# Patient Record
Sex: FEMALE | Race: BLACK OR AFRICAN AMERICAN | NOT HISPANIC OR LATINO | Employment: PART TIME | URBAN - METROPOLITAN AREA
[De-identification: names, ages, dates, MRNs, and addresses within clinical notes are randomized per-mention and may not be internally consistent; named-entity substitution may affect disease eponyms.]

---

## 2018-12-17 ENCOUNTER — HOSPITAL ENCOUNTER (EMERGENCY)
Facility: HOSPITAL | Age: 68
Discharge: HOME | End: 2018-12-18
Attending: EMERGENCY MEDICINE
Payer: MEDICARE

## 2018-12-17 DIAGNOSIS — T38.3X5A HYPOGLYCEMIA DUE TO INSULIN: Primary | ICD-10-CM

## 2018-12-17 DIAGNOSIS — E16.0 HYPOGLYCEMIA SECONDARY TO SULFONYLUREA, ACCIDENTAL OR UNINTENTIONAL, INITIAL ENCOUNTER: ICD-10-CM

## 2018-12-17 DIAGNOSIS — T38.3X1A HYPOGLYCEMIA SECONDARY TO SULFONYLUREA, ACCIDENTAL OR UNINTENTIONAL, INITIAL ENCOUNTER: ICD-10-CM

## 2018-12-17 DIAGNOSIS — E16.0 HYPOGLYCEMIA DUE TO INSULIN: Primary | ICD-10-CM

## 2018-12-17 LAB
BASOPHILS # BLD AUTO: 0.02 K/UL
BASOPHILS NFR BLD: 0.3 %
DIFFERENTIAL METHOD: ABNORMAL
EOSINOPHIL # BLD AUTO: 0.1 K/UL
EOSINOPHIL NFR BLD: 1.7 %
ERYTHROCYTE [DISTWIDTH] IN BLOOD BY AUTOMATED COUNT: 13.1 %
HCT VFR BLD AUTO: 35.7 %
HGB BLD-MCNC: 11.1 G/DL
LYMPHOCYTES # BLD AUTO: 1.7 K/UL
LYMPHOCYTES NFR BLD: 23.6 %
MCH RBC QN AUTO: 27.3 PG
MCHC RBC AUTO-ENTMCNC: 31.1 G/DL
MCV RBC AUTO: 88 FL
MONOCYTES # BLD AUTO: 1.3 K/UL
MONOCYTES NFR BLD: 18.2 %
NEUTROPHILS # BLD AUTO: 4 K/UL
NEUTROPHILS NFR BLD: 56.1 %
PLATELET # BLD AUTO: 242 K/UL
PMV BLD AUTO: 10.5 FL
POCT GLUCOSE: 173 MG/DL (ref 70–110)
POCT GLUCOSE: 33 MG/DL (ref 70–110)
RBC # BLD AUTO: 4.07 M/UL
WBC # BLD AUTO: 7.13 K/UL

## 2018-12-17 PROCEDURE — 85025 COMPLETE CBC W/AUTO DIFF WBC: CPT

## 2018-12-17 PROCEDURE — 63600175 PHARM REV CODE 636 W HCPCS: Performed by: EMERGENCY MEDICINE

## 2018-12-17 PROCEDURE — 25000003 PHARM REV CODE 250

## 2018-12-17 PROCEDURE — 82962 GLUCOSE BLOOD TEST: CPT | Mod: 91

## 2018-12-17 PROCEDURE — 80053 COMPREHEN METABOLIC PANEL: CPT

## 2018-12-17 PROCEDURE — 96374 THER/PROPH/DIAG INJ IV PUSH: CPT

## 2018-12-17 PROCEDURE — 99285 EMERGENCY DEPT VISIT HI MDM: CPT | Mod: 25

## 2018-12-17 RX ORDER — DEXTROSE 50 % IN WATER (D50W) INTRAVENOUS SYRINGE
Status: COMPLETED
Start: 2018-12-17 | End: 2018-12-17

## 2018-12-17 RX ORDER — OCTREOTIDE ACETATE 100 UG/ML
100 INJECTION, SOLUTION INTRAVENOUS; SUBCUTANEOUS
Status: COMPLETED | OUTPATIENT
Start: 2018-12-17 | End: 2018-12-17

## 2018-12-17 RX ORDER — DEXTROSE 50 % IN WATER (D50W) INTRAVENOUS SYRINGE
25
Status: COMPLETED | OUTPATIENT
Start: 2018-12-17 | End: 2018-12-17

## 2018-12-17 RX ADMIN — DEXTROSE 50 % IN WATER (D50W) INTRAVENOUS SYRINGE 25 G: at 11:12

## 2018-12-17 RX ADMIN — DEXTROSE MONOHYDRATE 25 G: 500 INJECTION PARENTERAL at 11:12

## 2018-12-17 RX ADMIN — OCTREOTIDE ACETATE 100 MCG: 100 INJECTION, SOLUTION INTRAVENOUS; SUBCUTANEOUS at 11:12

## 2018-12-18 VITALS
WEIGHT: 240 LBS | TEMPERATURE: 98 F | OXYGEN SATURATION: 94 % | HEIGHT: 64 IN | BODY MASS INDEX: 40.97 KG/M2 | RESPIRATION RATE: 20 BRPM | SYSTOLIC BLOOD PRESSURE: 172 MMHG | DIASTOLIC BLOOD PRESSURE: 77 MMHG | HEART RATE: 72 BPM

## 2018-12-18 LAB
ALBUMIN SERPL BCP-MCNC: 3.4 G/DL
ALP SERPL-CCNC: 71 U/L
ALT SERPL W/O P-5'-P-CCNC: 18 U/L
ANION GAP SERPL CALC-SCNC: 11 MMOL/L
AST SERPL-CCNC: 31 U/L
BILIRUB SERPL-MCNC: 0.3 MG/DL
BILIRUB UR QL STRIP: NEGATIVE
BUN SERPL-MCNC: 22 MG/DL
CALCIUM SERPL-MCNC: 9 MG/DL
CHLORIDE SERPL-SCNC: 106 MMOL/L
CLARITY UR: CLEAR
CO2 SERPL-SCNC: 20 MMOL/L
COLOR UR: YELLOW
CREAT SERPL-MCNC: 1.5 MG/DL
EST. GFR  (AFRICAN AMERICAN): 42 ML/MIN/1.73 M^2
EST. GFR  (NON AFRICAN AMERICAN): 36 ML/MIN/1.73 M^2
GLUCOSE SERPL-MCNC: 42 MG/DL
GLUCOSE UR QL STRIP: NEGATIVE
HGB UR QL STRIP: NEGATIVE
KETONES UR QL STRIP: NEGATIVE
LEUKOCYTE ESTERASE UR QL STRIP: NEGATIVE
NITRITE UR QL STRIP: NEGATIVE
PH UR STRIP: 5 [PH] (ref 5–8)
POCT GLUCOSE: 164 MG/DL (ref 70–110)
POCT GLUCOSE: 325 MG/DL (ref 70–110)
POTASSIUM SERPL-SCNC: 4.5 MMOL/L
PROT SERPL-MCNC: 7.2 G/DL
PROT UR QL STRIP: ABNORMAL
SODIUM SERPL-SCNC: 137 MMOL/L
SP GR UR STRIP: >=1.03 (ref 1–1.03)
URN SPEC COLLECT METH UR: ABNORMAL
UROBILINOGEN UR STRIP-ACNC: NEGATIVE EU/DL

## 2018-12-18 PROCEDURE — 81003 URINALYSIS AUTO W/O SCOPE: CPT

## 2018-12-18 RX ORDER — ALBUTEROL SULFATE 90 UG/1
1-2 AEROSOL, METERED RESPIRATORY (INHALATION) EVERY 6 HOURS PRN
Qty: 1 INHALER | Refills: 0 | Status: SHIPPED | OUTPATIENT
Start: 2018-12-18

## 2018-12-18 RX ORDER — BENZONATATE 200 MG/1
200 CAPSULE ORAL 3 TIMES DAILY PRN
Qty: 30 CAPSULE | Refills: 0 | Status: SHIPPED | OUTPATIENT
Start: 2018-12-18

## 2018-12-18 NOTE — ED NOTES
"Family brought pt in and states that she "was not acting normal". CBG at home 38. Pt ate chips. Upon arrival pt's CBG 33. Orange juice and cookies given. Pt has been asymptomatic since arrival.  "

## 2018-12-18 NOTE — ED PROVIDER NOTES
"Encounter Date: 12/17/2018    SCRIBE #1 NOTE: I, Ok Schofield, am scribing for, and in the presence of,  Dr. Juvenal Garcia. I have scribed the entire note.       History     Chief Complaint   Patient presents with    Hypoglycemia     Family reports patients cbg at home was 38. States "she was talking out of her mind". CBG was 33 mg/dl here in the ER. Patient is awake and alert, Denies any dizziness, restlessness, or diaphoresis.     Altered Mental Status     This is a 68 y.o. female who has no past medical history on file.     The patient presents to the Emergency Department for evaluation of hypoglycemia.  Patient had chips to eat after measured blood glucose of 38 without improvement.   Symptoms are associated with cough, sinus pressure, and AMS, per family.  Family states patient was talking excessively while watching TV.  Pt denies fever,  Rhinorrhea, or any other complaints.  Patient has had prior history of similar symptoms.  Family states patient does not eat correctly and only has 1 meal a day.      The history is provided by the patient and a relative.     Review of patient's allergies indicates:   Allergen Reactions    Codeine      No past medical history on file.  No past surgical history on file.  No family history on file.  Social History     Tobacco Use    Smoking status: Not on file   Substance Use Topics    Alcohol use: Not on file    Drug use: Not on file     Review of Systems   Constitutional: Negative for fever.   HENT: Positive for sinus pressure. Negative for rhinorrhea.    Respiratory: Positive for cough.    Psychiatric/Behavioral:        + AMS, per family   All other systems reviewed and are negative.      Physical Exam     Initial Vitals [12/17/18 2313]   BP Pulse Resp Temp SpO2   136/62 80 20 98.2 °F (36.8 °C) 95 %      MAP       --         Physical Exam    Nursing note and vitals reviewed.  Constitutional: She appears well-developed and well-nourished. No distress.   Obese.   HENT:   Head: " Normocephalic and atraumatic.   Mouth/Throat: Oropharynx is clear and moist.   Eyes: Conjunctivae and EOM are normal. Pupils are equal, round, and reactive to light.   Neck: Normal range of motion. Neck supple.   Cardiovascular: Normal rate, regular rhythm, normal heart sounds and intact distal pulses. Exam reveals no gallop and no friction rub.    No murmur heard.  Pulmonary/Chest: No respiratory distress. She has wheezes. She has no rhonchi. She has no rales.   Expiratory wheezing to lower lung fields bilaterally.   Abdominal: Soft. She exhibits no distension and no mass. There is no tenderness.   Musculoskeletal: Normal range of motion.   No LE edema.   Neurological: She is alert and oriented to person, place, and time.   Skin: Skin is warm and dry.         ED Course   Procedures  Labs Reviewed   CBC W/ AUTO DIFFERENTIAL - Abnormal; Notable for the following components:       Result Value    Hemoglobin 11.1 (*)     Hematocrit 35.7 (*)     MCHC 31.1 (*)     Mono # 1.3 (*)     Mono% 18.2 (*)     All other components within normal limits   COMPREHENSIVE METABOLIC PANEL - Abnormal; Notable for the following components:    CO2 20 (*)     Glucose 42 (*)     Creatinine 1.5 (*)     Albumin 3.4 (*)     eGFR if  42 (*)     eGFR if non  36 (*)     All other components within normal limits    Narrative:     GLU critical result(s) called and verbal readback obtained from   Shanice Sánchez RN, 12/18/2018 00:19   URINALYSIS, REFLEX TO URINE CULTURE - Abnormal; Notable for the following components:    Specific Gravity, UA >=1.030 (*)     Protein, UA Trace (*)     All other components within normal limits    Narrative:     Preferred Collection Type->Urine, Clean Catch   POCT GLUCOSE - Abnormal; Notable for the following components:    POCT Glucose 33 (*)     All other components within normal limits   POCT GLUCOSE - Abnormal; Notable for the following components:    POCT Glucose 173 (*)     All  "other components within normal limits   POCT GLUCOSE - Abnormal; Notable for the following components:    POCT Glucose 164 (*)     All other components within normal limits   POCT GLUCOSE - Abnormal; Notable for the following components:    POCT Glucose 325 (*)     All other components within normal limits   POCT GLUCOSE, HAND-HELD DEVICE          Imaging Results          X-Ray Chest PA And Lateral (Final result)  Result time 12/18/18 01:08:02    Final result by Seun Bhatti MD (12/18/18 01:08:02)                 Impression:      No acute cardiopulmonary finding.      Electronically signed by: Seun Bhatti MD  Date:    12/18/2018  Time:    01:08             Narrative:    EXAMINATION:  XR CHEST PA AND LATERAL    CLINICAL HISTORY:  Provided history is "COUGH;  ".    TECHNIQUE:  Frontal and lateral views of the chest were performed.    COMPARISON:  None.    FINDINGS:  Cardiac silhouette is not enlarged.  Atherosclerotic calcifications overlie the aortic arch.  No focal consolidation.  No sizable pleural effusion.  No pneumothorax.                                 Medical Decision Making:   Clinical Tests:   Lab Tests: Ordered and Reviewed  Radiological Study: Ordered and Reviewed              Attending Attestation:         Attending Critical Care:   Critical Care Times:   ==============================================================  · Total Critical Care Time - exclusive of procedural time: 45 minutes.  ==============================================================  Critical care was necessary to treat or prevent imminent or life-threatening deterioration of the following conditions: endocrine crisis.               ED Course as of Dec 18 0435   Mon Dec 17, 2018   9900 I, Dr. Juvenal Garcia, personally performed the services described in this documentation.   All medical record entries made by the scribe were at my direction and in my presence.   I have reviewed the chart and agree that the record is accurate and " complete.   uJvenal Garcia MD.    [NP]   7403 This is an emergent evaluation of a 68 y.o.female patient with presentation of hypoglycemia on long-acting insulin, glipizide.   Initial differentials include but are not limited to: sulfonylurea toxicity, UTI, COREY, other infectious etiology, decrease PO intake.   Plan: basic labs, UA, CXR (cough), octreotide, meal tray, q1hr fingersticks  [NP]   Tue Dec 18, 2018   0127 POCT Glucose: (!) 164 [NP]   0127 Sodium: 137 [NP]   0127 Potassium: 4.5 [NP]   0127 Chloride: 106 [NP]   0127 CO2: (!) 20 [NP]   0127 BUN, Bld: 22 [NP]   0127 Creatinine: (!) 1.5 [NP]   0127 WBC: 7.13 [NP]   0127 Hemoglobin: (!) 11.1 [NP]   0127 Hematocrit: (!) 35.7 [NP]   0127 Platelets: 242 [NP]      ED Course User Index  [NP] Juvenal Garcia MD     3AM - Repeat blood glucose 325.  Glucose is climbing and I believe patient is stable for discharge at this time.  Advised to drink plenty of fluids, monitor sugar at home especially before every meal.  Also advised to take medications as prescribed, however to hold if sugar is low.  Follow up with PCP in 1 week or return for any concerns.    Clinical Impression:     1. Hypoglycemia due to insulin    2. Hypoglycemia secondary to sulfonylurea, accidental or unintentional, initial encounter                                   Juvenal Garcia MD  12/18/18 9925       Juvenal Garcia MD  12/18/18 1853

## 2018-12-18 NOTE — ED NOTES
Report received from CODY Ching. Care assumed. Pt AAOx4. Respirations even and unlabored. Pt VSS. Will continue to monitor.

## 2018-12-18 NOTE — DISCHARGE INSTRUCTIONS
Take your blood sugar before every meal.  If your sugar is low (<60), consider holding your diabetic medication.  Eat a well-balanced diet with a lot of vegetables.   Only eat a small amount of meat.   Eliminate sugar, starch, processed foods such as chips/crackers.    Thank you for choosing [facility]! We appreciate you coming to us for your medical care. We hope you feel better soon! Please come back to [facility] for all of your future medical needs.      Sincerely,    Juvenal Garcia MD  Medical Director  Emergency Department

## 2023-03-06 ENCOUNTER — HOSPITAL ENCOUNTER (EMERGENCY)
Facility: HOSPITAL | Age: 73
Discharge: HOME | End: 2023-03-06
Attending: STUDENT IN AN ORGANIZED HEALTH CARE EDUCATION/TRAINING PROGRAM
Payer: MEDICARE

## 2023-03-06 VITALS
HEIGHT: 64 IN | HEART RATE: 60 BPM | OXYGEN SATURATION: 100 % | RESPIRATION RATE: 18 BRPM | TEMPERATURE: 98 F | WEIGHT: 249 LBS | SYSTOLIC BLOOD PRESSURE: 174 MMHG | BODY MASS INDEX: 42.51 KG/M2 | DIASTOLIC BLOOD PRESSURE: 70 MMHG

## 2023-03-06 DIAGNOSIS — R10.9 ABDOMINAL PAIN: ICD-10-CM

## 2023-03-06 DIAGNOSIS — R10.9 ABDOMINAL PAIN, UNSPECIFIED ABDOMINAL LOCATION: Primary | ICD-10-CM

## 2023-03-06 LAB
ALBUMIN SERPL BCP-MCNC: 3.8 G/DL (ref 3.5–5.2)
ALP SERPL-CCNC: 80 U/L (ref 55–135)
ALT SERPL W/O P-5'-P-CCNC: 13 U/L (ref 10–44)
ANION GAP SERPL CALC-SCNC: 10 MMOL/L (ref 8–16)
AST SERPL-CCNC: 15 U/L (ref 10–40)
BASOPHILS # BLD AUTO: 0.04 K/UL (ref 0–0.2)
BASOPHILS NFR BLD: 0.4 % (ref 0–1.9)
BILIRUB SERPL-MCNC: 0.8 MG/DL (ref 0.1–1)
BILIRUB UR QL STRIP: NEGATIVE
BUN SERPL-MCNC: 29 MG/DL (ref 8–23)
CALCIUM SERPL-MCNC: 9.5 MG/DL (ref 8.7–10.5)
CHLORIDE SERPL-SCNC: 108 MMOL/L (ref 95–110)
CLARITY UR: CLEAR
CO2 SERPL-SCNC: 24 MMOL/L (ref 23–29)
COLOR UR: YELLOW
CREAT SERPL-MCNC: 1.7 MG/DL (ref 0.5–1.4)
DIFFERENTIAL METHOD: ABNORMAL
EOSINOPHIL # BLD AUTO: 0.3 K/UL (ref 0–0.5)
EOSINOPHIL NFR BLD: 2.9 % (ref 0–8)
ERYTHROCYTE [DISTWIDTH] IN BLOOD BY AUTOMATED COUNT: 13.7 % (ref 11.5–14.5)
EST. GFR  (NO RACE VARIABLE): 32 ML/MIN/1.73 M^2
GLUCOSE SERPL-MCNC: 90 MG/DL (ref 70–110)
GLUCOSE UR QL STRIP: NEGATIVE
HCT VFR BLD AUTO: 37.7 % (ref 37–48.5)
HGB BLD-MCNC: 11.9 G/DL (ref 12–16)
HGB UR QL STRIP: NEGATIVE
IMM GRANULOCYTES # BLD AUTO: 0.05 K/UL (ref 0–0.04)
IMM GRANULOCYTES NFR BLD AUTO: 0.4 % (ref 0–0.5)
KETONES UR QL STRIP: NEGATIVE
LEUKOCYTE ESTERASE UR QL STRIP: NEGATIVE
LYMPHOCYTES # BLD AUTO: 1.8 K/UL (ref 1–4.8)
LYMPHOCYTES NFR BLD: 15.8 % (ref 18–48)
MCH RBC QN AUTO: 28 PG (ref 27–31)
MCHC RBC AUTO-ENTMCNC: 31.6 G/DL (ref 32–36)
MCV RBC AUTO: 89 FL (ref 82–98)
MONOCYTES # BLD AUTO: 1 K/UL (ref 0.3–1)
MONOCYTES NFR BLD: 8.4 % (ref 4–15)
NEUTROPHILS # BLD AUTO: 8.2 K/UL (ref 1.8–7.7)
NEUTROPHILS NFR BLD: 72.1 % (ref 38–73)
NITRITE UR QL STRIP: NEGATIVE
NRBC BLD-RTO: 0 /100 WBC
PH UR STRIP: 6 [PH] (ref 5–8)
PLATELET # BLD AUTO: 264 K/UL (ref 150–450)
PMV BLD AUTO: 10.8 FL (ref 9.2–12.9)
POTASSIUM SERPL-SCNC: 5.3 MMOL/L (ref 3.5–5.1)
PROT SERPL-MCNC: 8.1 G/DL (ref 6–8.4)
PROT UR QL STRIP: NEGATIVE
RBC # BLD AUTO: 4.25 M/UL (ref 4–5.4)
SODIUM SERPL-SCNC: 142 MMOL/L (ref 136–145)
SP GR UR STRIP: 1.01 (ref 1–1.03)
URN SPEC COLLECT METH UR: NORMAL
UROBILINOGEN UR STRIP-ACNC: NEGATIVE EU/DL
WBC # BLD AUTO: 11.4 K/UL (ref 3.9–12.7)

## 2023-03-06 PROCEDURE — 99284 EMERGENCY DEPT VISIT MOD MDM: CPT

## 2023-03-06 PROCEDURE — 81003 URINALYSIS AUTO W/O SCOPE: CPT | Performed by: NURSE PRACTITIONER

## 2023-03-06 PROCEDURE — 80053 COMPREHEN METABOLIC PANEL: CPT | Performed by: NURSE PRACTITIONER

## 2023-03-06 PROCEDURE — 85025 COMPLETE CBC W/AUTO DIFF WBC: CPT | Performed by: NURSE PRACTITIONER

## 2023-03-06 RX ORDER — ONDANSETRON 2 MG/ML
4 INJECTION INTRAMUSCULAR; INTRAVENOUS
Status: DISCONTINUED | OUTPATIENT
Start: 2023-03-06 | End: 2023-03-06 | Stop reason: HOSPADM

## 2023-03-06 RX ORDER — ONDANSETRON 4 MG/1
4 TABLET, ORALLY DISINTEGRATING ORAL EVERY 6 HOURS PRN
Qty: 30 TABLET | Refills: 0 | Status: SHIPPED | OUTPATIENT
Start: 2023-03-06

## 2023-03-06 NOTE — FIRST PROVIDER EVALUATION
Emergency Department TeleTriage Encounter Note      CHIEF COMPLAINT    Chief Complaint   Patient presents with    Abdominal Pain     Right flank/RUQ/RLQ pain with nausea x 2 weeks, denies urinary complaints, increased pain with movement        VITAL SIGNS   Initial Vitals [03/06/23 1724]   BP Pulse Resp Temp SpO2   (!) 187/78 (!) 57 20 98 °F (36.7 °C) 95 %      MAP       --            ALLERGIES    Review of patient's allergies indicates:   Allergen Reactions    Codeine        PROVIDER TRIAGE NOTE  This is a teletriage evaluation of a 72 y.o. female presenting to the ED with c/o right sided abdominal/flank pain, x 2 weeks, intermittent. Limited physical exam via telehealth: The patient is awake, alert, answering questions appropriately and is not in respiratory distress. Initial orders will be placed and care will be transferred to an alternate provider when patient is roomed for a full evaluation. Any additional orders and the final disposition will be determined by that provider.         ORDERS  Labs Reviewed - No data to display    ED Orders (720h ago, onward)      Start Ordered     Status Ordering Provider    03/06/23 1729 03/06/23 1728  Saline lock IV  Once         Ordered CYNDI CEJA    03/06/23 1729 03/06/23 1728  CBC auto differential  STAT         Ordered CYNDI CEJA    03/06/23 1729 03/06/23 1728  Comprehensive metabolic panel  STAT         Ordered CYNDI CEJA    03/06/23 1729 03/06/23 1728  Urinalysis, Reflex to Urine Culture Urine, Clean Catch  STAT         Ordered CYNDI CEJA              Virtual Visit Note: The provider triage portion of this emergency department evaluation and documentation was performed via Jogg, a HIPAA-compliant telemedicine application, in concert with a tele-presenter in the room. A face to face patient evaluation with one of my colleagues will occur once the patient is placed in an emergency department room.      DISCLAIMER: This note was prepared  with M*Modal voice recognition transcription software. Garbled syntax, mangled pronouns, and other bizarre constructions may be attributed to that software system.

## 2023-03-07 NOTE — ED PROVIDER NOTES
Encounter Date: 3/6/2023       History     Chief Complaint   Patient presents with    Abdominal Pain     Right flank/RUQ/RLQ pain with nausea x 2 weeks, denies urinary complaints, increased pain with movement      72 year old female who presents asymptomatic. She says that she had abdominal pain earlier in the day while she was in her kitchen scrubbing dishes but now it is gone. She says that the pain is completely gone at this time and would like to leave.    Review of patient's allergies indicates:   Allergen Reactions    Codeine      No past medical history on file.  No past surgical history on file.  No family history on file.     Review of Systems   All other systems reviewed and are negative.    Physical Exam     Initial Vitals [03/06/23 1724]   BP Pulse Resp Temp SpO2   (!) 187/78 (!) 57 20 98 °F (36.7 °C) 95 %      MAP       --         Physical Exam    Nursing note and vitals reviewed.  Constitutional: She appears well-developed and well-nourished. She is not diaphoretic. No distress.   HENT:   Head: Normocephalic and atraumatic.   Eyes: EOM are normal. Pupils are equal, round, and reactive to light.   Neck: No tracheal deviation present.   Normal range of motion.  Cardiovascular:  Normal rate, regular rhythm, normal heart sounds and intact distal pulses.     Exam reveals no gallop and no friction rub.       No murmur heard.  Pulmonary/Chest: Breath sounds normal. No stridor. No respiratory distress. She has no wheezes. She has no rhonchi. She has no rales.   Abdominal: Abdomen is soft. Bowel sounds are normal. She exhibits no distension and no mass. There is no abdominal tenderness.   Patient ambulated without return of the pain.  There is no rebound and no guarding.   Musculoskeletal:         General: No edema. Normal range of motion.      Cervical back: Normal range of motion.     Neurological: She is alert and oriented to person, place, and time. GCS score is 15. GCS eye subscore is 4. GCS verbal  subscore is 5. GCS motor subscore is 6.   Skin: Skin is warm and dry. Capillary refill takes less than 2 seconds.   Psychiatric: She has a normal mood and affect. Thought content normal.       ED Course   Procedures  Labs Reviewed   CBC W/ AUTO DIFFERENTIAL - Abnormal; Notable for the following components:       Result Value    Hemoglobin 11.9 (*)     MCHC 31.6 (*)     Gran # (ANC) 8.2 (*)     Immature Grans (Abs) 0.05 (*)     Lymph % 15.8 (*)     All other components within normal limits   COMPREHENSIVE METABOLIC PANEL - Abnormal; Notable for the following components:    Potassium 5.3 (*)     BUN 29 (*)     Creatinine 1.7 (*)     eGFR 32 (*)     All other components within normal limits   URINALYSIS, REFLEX TO URINE CULTURE    Narrative:     Specimen Source->Urine   TROPONIN I          Imaging Results    None          Medications   ondansetron injection 4 mg (4 mg Intravenous Not Given 3/6/23 1730)     Medical Decision Making:   Initial Assessment:   Hemodynamically stable. Afebrile. Phonating and protecting the airway spontaneously. No clinical evidence for cardiovascular instability or impending airway compromise. Examination as above. Labs reviewed. Patient was informed of these studies including her creatinine. I offered a CTAP due to her age and chief complaint in addition to cardiac testing. Patient instead wished to go home. She said it was late and she lives closely. Shared decision making conversation held. Strict return precautions given.   ED Management:  The patient was reassessed and on subsequent re-evaluation, they were subjectively feeling better. They were resting comfortably and in no acute distress. I discussed the laboratory and diagnostic findings with the patient. Education was provided and all questions were answered. As discussed, they were recommended to follow up with their primary care physician within the next few days and to return to the emergency department sooner for any new or  worsening. They acknowledged and verbalized agreement to the treatment plan. The patient was discharged home in stable condition.     DISCLAIMER: This note was prepared with [employer] voice recognition transcription software. Garbled syntax, mangled pronouns, and other bizarre constructions may be attributed to that software system.                            Clinical Impression:   Final diagnoses:  [R10.9] Abdominal pain  [R10.9] Abdominal pain, unspecified abdominal location (Primary)        ED Disposition Condition    Discharge Stable          ED Prescriptions       Medication Sig Dispense Start Date End Date Auth. Provider    ondansetron (ZOFRAN-ODT) 4 MG TbDL Take 1 tablet (4 mg total) by mouth every 6 (six) hours as needed (nausea). 30 tablet 3/6/2023 -- Jovany Schaeffer MD          Follow-up Information       Follow up With Specialties Details Why Contact Info    [facility]  Go to  As needed [address] [address] [id number]  [phone]             Jovany Schaeffer MD  03/06/23 3980

## 2023-03-07 NOTE — DISCHARGE INSTRUCTIONS

## 2023-05-09 ENCOUNTER — PATIENT MESSAGE (OUTPATIENT)
Dept: RESEARCH | Facility: HOSPITAL | Age: 73
End: 2023-05-09
Payer: MEDICARE

## 2023-05-16 ENCOUNTER — PATIENT MESSAGE (OUTPATIENT)
Dept: RESEARCH | Facility: HOSPITAL | Age: 73
End: 2023-05-16
Payer: MEDICARE

## 2023-08-03 NOTE — ED NOTES
Patient given a cup of orange juice and a sugar cookie per JOHN Caldwell RN.    Vaccine status unknown

## 2024-02-09 ENCOUNTER — OFFICE VISIT (OUTPATIENT)
Dept: URGENT CARE | Facility: CLINIC | Age: 74
End: 2024-02-09
Payer: MEDICARE

## 2024-02-09 VITALS
SYSTOLIC BLOOD PRESSURE: 147 MMHG | DIASTOLIC BLOOD PRESSURE: 74 MMHG | TEMPERATURE: 98 F | OXYGEN SATURATION: 95 % | HEART RATE: 67 BPM | HEIGHT: 64 IN | BODY MASS INDEX: 42.49 KG/M2 | WEIGHT: 248.88 LBS | RESPIRATION RATE: 18 BRPM

## 2024-02-09 DIAGNOSIS — S42.291A HUMERAL HEAD FRACTURE, RIGHT, CLOSED, INITIAL ENCOUNTER: Primary | ICD-10-CM

## 2024-02-09 DIAGNOSIS — S49.91XA ARM INJURY, RIGHT, INITIAL ENCOUNTER: ICD-10-CM

## 2024-02-09 DIAGNOSIS — S00.81XA ABRASION OF FACE, INITIAL ENCOUNTER: ICD-10-CM

## 2024-02-09 PROCEDURE — 90471 IMMUNIZATION ADMIN: CPT | Mod: S$GLB,,,

## 2024-02-09 PROCEDURE — 73060 X-RAY EXAM OF HUMERUS: CPT | Mod: FY,RT,S$GLB, | Performed by: RADIOLOGY

## 2024-02-09 PROCEDURE — 90714 TD VACC NO PRESV 7 YRS+ IM: CPT | Mod: S$GLB,,,

## 2024-02-09 PROCEDURE — 99204 OFFICE O/P NEW MOD 45 MIN: CPT | Mod: 25,S$GLB,,

## 2024-02-09 RX ORDER — MUPIROCIN 20 MG/G
OINTMENT TOPICAL 3 TIMES DAILY
Qty: 22 G | Refills: 0 | Status: SHIPPED | OUTPATIENT
Start: 2024-02-09

## 2024-02-09 RX ORDER — MUPIROCIN 20 MG/G
OINTMENT TOPICAL
Status: COMPLETED | OUTPATIENT
Start: 2024-02-09 | End: 2024-02-09

## 2024-02-09 RX ADMIN — MUPIROCIN: 20 OINTMENT TOPICAL at 05:02

## 2024-02-09 NOTE — PATIENT INSTRUCTIONS
Humerus Fracture  You have a broken bone in your right upper arm.  Please wear sling at all times.  You may take tylenol as needed for pain.  Please use ice to help with pain and swelling.  Do not use the arm, please allow it to rest and heal.  A referral has been placed for you to be seen with an orthopedic doctor. You should receive a call from [facility] within the next 1-3 days to set up an appointment. If you do not receive a call, you may call our Referral Hotline at [phone] to make an appointment.  If you develop worsening pain, swelling, color change, numbness/tingling, please go to ED.  Facial Abrasions  Please apply mupirocin ointment to prevent infection.  Tetanus was updated today in clinic.  Facial Injury  Please continue to monitor yourself for any signs of head injury.  If you develop headache, dizziness, neck pain, confusion, speech changes, facial droop, extremity weakness/numbness, PLEASE GO TO THE EMERGENCY ROOM.

## 2024-02-09 NOTE — PROGRESS NOTES
"Subjective:      Patient ID: Christine Delgadillo is a 73 y.o. female.    Vitals:  height is 5' 4" (1.626 m) and weight is 112.9 kg (248 lb 14.4 oz). Her oral temperature is 98.1 °F (36.7 °C). Her blood pressure is 147/74 (abnormal) and her pulse is 67. Her respiration is 18 and oxygen saturation is 95%.     Chief Complaint: Arm Injury    Pt arrives for injury to right arm,left knee, and face due to fall. Fall occurred today. No at home tx was done.    Provider note starts below:  Patient presents to clinic for evaluation after a fall. Patient was getting out of the car when she missed her step and fell onto the cement. Patient reports landing on her right arm and brushing her nose against the cement. She is complaining of right upper arm pain. There are small abrasions to her nasal bridge and upper lip, but no active bleeding. She reports abrasion to left knee but no left knee pain. Patient did not lose consciousness. She was ambulatory after the fall. Denies headache, neck pain, neck stiffness, back pain, nausea, vomiting, vision changes, extremity weakness/numbness, dizziness, lightheadedness, speech difficulty, facial droop. Of note, patient is on Plavix. Tetanus status is not up to date.     Arm Injury   The incident occurred 3 to 6 hours ago. Incident location: doctors office. The injury mechanism was a fall. The pain is present in the upper right arm (left knee, and face). The quality of the pain is described as aching and shooting. The pain is at a severity of 9/10. The pain has been Constant since the incident. Pertinent negatives include no chest pain or numbness. She has tried nothing for the symptoms. The treatment provided no relief.       Constitution: Negative for activity change, chills and fever.   HENT:  Positive for facial swelling and facial trauma. Negative for ear pain, dental problem, mouth sores, tongue pain, nosebleeds and sinus pain.    Neck: Negative for neck pain and neck stiffness. "   Cardiovascular:  Negative for chest pain and palpitations.   Eyes:  Negative for eye trauma, eye pain, vision loss, double vision and blurred vision.   Respiratory:  Negative for chest tightness, cough, bloody sputum and shortness of breath.    Gastrointestinal:  Negative for abdominal trauma, abdominal pain, nausea and vomiting.   Musculoskeletal:  Positive for pain (right upper arm) and trauma. Negative for joint pain, joint swelling, abnormal ROM of joint and back pain.   Skin:  Positive for abrasion. Negative for pale, rash and laceration.   Neurological:  Negative for dizziness, light-headedness, passing out, facial drooping, speech difficulty, coordination disturbances, loss of balance, headaches, disorientation, altered mental status, loss of consciousness, numbness, tingling, seizures and tremors.   Psychiatric/Behavioral:  Negative for altered mental status, disorientation and confusion.       Objective:     Physical Exam   Constitutional: She is oriented to person, place, and time.  Non-toxic appearance. She does not appear ill. No distress.   HENT:   Head: Normocephalic.   Eyes: Conjunctivae are normal. Extraocular movement intact   Neck: Neck supple. No neck rigidity present.   Cardiovascular: Normal rate, regular rhythm, normal heart sounds and normal pulses.   Pulmonary/Chest: Effort normal and breath sounds normal.   Abdominal: Normal appearance.   Musculoskeletal: Normal range of motion.         General: Tenderness (to right upper arm) present. No swelling or deformity. Normal range of motion.      Cervical back: She exhibits no tenderness.   Neurological: She is alert, oriented to person, place, and time and at baseline. She displays no weakness. No cranial nerve deficit or sensory deficit. Gait normal.   Skin: Skin is warm and dry.         Comments: Small abrasion to nasal bridge and superior to upper lip. Abrasions noted to upper and lower lip with associated swelling. No active bleeding. No  lacerations.    Psychiatric: Her behavior is normal. Mood normal.   Nursing note and vitals reviewed.    Assessment:     XR Humerus Right  Impression: Acute fracture involving the right humeral head and neck with limited visualization. Recommend orthopedic follow-up.   Radiologist: Surendra Myles     1. Humeral head fracture, right, closed, initial encounter    2. Arm injury, right, initial encounter    3. Abrasion of face, initial encounter        Plan:     Humeral head fracture, right, closed, initial encounter  -     Ambulatory referral/consult to Orthopedics  -     Cancel: Splint application; Future  -     SLING FOR HOME USE    Arm injury, right, initial encounter  -     XR HUMERUS 2 VIEW RIGHT; Future; Expected date: 02/09/2024    Abrasion of face, initial encounter  -     mupirocin 2 % ointment  -     mupirocin (BACTROBAN) 2 % ointment; Apply topically 3 (three) times daily.  Dispense: 22 g; Refill: 0  -     (In Office Administered) Td Vaccine      Medical Decision Making:   Urgent Care Management:  Patient fell while getting out of car just PTA.  Injuries noted to right arm, left knee, and face. Reports landing first on her right arm and then scraping face on cement.  There was no true head injury per patient history.  Discussed w/ patient that due to her age and being on Plavix, she is at risk for brain bleed and qualifies for CT scan. Due to this risk, tylenol only for pain control.  Patient does not wish to go to ED at this time. She is HDS with acute focal neuro deficits, GCS is 15.   Extensive conversation was had between me and patient. Discussed any red flag symptoms that would warrant her going to ED.  Patient verbalized understanding and will continue to monitor for symptoms.        Patient Instructions   Humerus Fracture  You have a broken bone in your right upper arm.  Please wear sling at all times.  You may take tylenol as needed for pain.  Please use ice to help with pain and swelling.  Do not  use the arm, please allow it to rest and heal.  A referral has been placed for you to be seen with an orthopedic doctor. You should receive a call from [facility] within the next 1-3 days to set up an appointment. If you do not receive a call, you may call our Referral Hotline at [phone] to make an appointment.  If you develop worsening pain, swelling, color change, numbness/tingling, please go to ED.  Facial Abrasions  Please apply mupirocin ointment to prevent infection.  Tetanus was updated today in clinic.  Facial Injury  Please continue to monitor yourself for any signs of head injury.  If you develop headache, dizziness, neck pain, confusion, speech changes, facial droop, extremity weakness/numbness, PLEASE GO TO THE EMERGENCY ROOM.

## 2024-12-01 ENCOUNTER — HOSPITAL ENCOUNTER (EMERGENCY)
Facility: HOSPITAL | Age: 74
Discharge: HOME | End: 2024-12-01
Attending: EMERGENCY MEDICINE
Payer: MEDICARE

## 2024-12-01 VITALS
WEIGHT: 240 LBS | RESPIRATION RATE: 20 BRPM | TEMPERATURE: 98 F | DIASTOLIC BLOOD PRESSURE: 73 MMHG | HEART RATE: 66 BPM | HEIGHT: 64 IN | SYSTOLIC BLOOD PRESSURE: 166 MMHG | BODY MASS INDEX: 40.97 KG/M2 | OXYGEN SATURATION: 93 %
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
## 2024-12-01 DIAGNOSIS — H11.30 SUBCONJUNCTIVAL HEMORRHAGE, UNSPECIFIED LATERALITY: ICD-10-CM

## 2024-12-01 DIAGNOSIS — S09.93XA DENTAL TRAUMA, INITIAL ENCOUNTER: ICD-10-CM

## 2024-12-01 DIAGNOSIS — S05.12XA PERIORBITAL CONTUSION OF LEFT EYE, INITIAL ENCOUNTER: Primary | ICD-10-CM

## 2024-12-01 LAB
ALBUMIN SERPL BCP-MCNC: 3.7 G/DL (ref 3.5–5.2)
ALP SERPL-CCNC: 81 U/L (ref 40–150)
ALT SERPL W/O P-5'-P-CCNC: 15 U/L (ref 10–44)
ANION GAP SERPL CALC-SCNC: 10 MMOL/L (ref 8–16)
AST SERPL-CCNC: 24 U/L (ref 10–40)
BASOPHILS # BLD AUTO: 0.06 K/UL (ref 0–0.2)
BASOPHILS NFR BLD: 0.4 % (ref 0–1.9)
BILIRUB SERPL-MCNC: 1.4 MG/DL (ref 0.1–1)
BUN SERPL-MCNC: 39 MG/DL (ref 8–23)
CALCIUM SERPL-MCNC: 9.2 MG/DL (ref 8.7–10.5)
CHLORIDE SERPL-SCNC: 113 MMOL/L (ref 95–110)
CK SERPL-CCNC: 391 U/L (ref 20–180)
CO2 SERPL-SCNC: 22 MMOL/L (ref 23–29)
CREAT SERPL-MCNC: 1.5 MG/DL (ref 0.5–1.4)
DIFFERENTIAL METHOD BLD: ABNORMAL
EOSINOPHIL # BLD AUTO: 0.1 K/UL (ref 0–0.5)
EOSINOPHIL NFR BLD: 0.5 % (ref 0–8)
ERYTHROCYTE [DISTWIDTH] IN BLOOD BY AUTOMATED COUNT: 14.3 % (ref 11.5–14.5)
EST. GFR  (NO RACE VARIABLE): 37 ML/MIN/1.73 M^2
GLUCOSE SERPL-MCNC: 127 MG/DL (ref 70–110)
HCT VFR BLD AUTO: 32.1 % (ref 37–48.5)
HGB BLD-MCNC: 10 G/DL (ref 12–16)
IMM GRANULOCYTES # BLD AUTO: 0.09 K/UL (ref 0–0.04)
IMM GRANULOCYTES NFR BLD AUTO: 0.6 % (ref 0–0.5)
LYMPHOCYTES # BLD AUTO: 0.8 K/UL (ref 1–4.8)
LYMPHOCYTES NFR BLD: 5.3 % (ref 18–48)
MCH RBC QN AUTO: 28.3 PG (ref 27–31)
MCHC RBC AUTO-ENTMCNC: 31.2 G/DL (ref 32–36)
MCV RBC AUTO: 91 FL (ref 82–98)
MONOCYTES # BLD AUTO: 0.9 K/UL (ref 0.3–1)
MONOCYTES NFR BLD: 6.4 % (ref 4–15)
NEUTROPHILS # BLD AUTO: 12.7 K/UL (ref 1.8–7.7)
NEUTROPHILS NFR BLD: 86.8 % (ref 38–73)
NRBC BLD-RTO: 0 /100 WBC
PLATELET # BLD AUTO: 232 K/UL (ref 150–450)
PMV BLD AUTO: 10.7 FL (ref 9.2–12.9)
POTASSIUM SERPL-SCNC: 4.3 MMOL/L (ref 3.5–5.1)
PROT SERPL-MCNC: 7.9 G/DL (ref 6–8.4)
RBC # BLD AUTO: 3.53 M/UL (ref 4–5.4)
SODIUM SERPL-SCNC: 145 MMOL/L (ref 136–145)
WBC # BLD AUTO: 14.57 K/UL (ref 3.9–12.7)

## 2024-12-01 PROCEDURE — 90715 TDAP VACCINE 7 YRS/> IM: CPT

## 2024-12-01 PROCEDURE — 96360 HYDRATION IV INFUSION INIT: CPT

## 2024-12-01 PROCEDURE — 25000003 PHARM REV CODE 250

## 2024-12-01 PROCEDURE — 90471 IMMUNIZATION ADMIN: CPT

## 2024-12-01 PROCEDURE — 63600175 PHARM REV CODE 636 W HCPCS

## 2024-12-01 PROCEDURE — 80053 COMPREHEN METABOLIC PANEL: CPT

## 2024-12-01 PROCEDURE — 82550 ASSAY OF CK (CPK): CPT

## 2024-12-01 PROCEDURE — 99285 EMERGENCY DEPT VISIT HI MDM: CPT | Mod: 25

## 2024-12-01 PROCEDURE — 85025 COMPLETE CBC W/AUTO DIFF WBC: CPT

## 2024-12-01 RX ORDER — TETRACAINE HYDROCHLORIDE 5 MG/ML
2 SOLUTION OPHTHALMIC
Status: COMPLETED | OUTPATIENT
Start: 2024-12-01 | End: 2024-12-01

## 2024-12-01 RX ADMIN — SODIUM CHLORIDE 1000 ML: 9 INJECTION, SOLUTION INTRAVENOUS at 01:12

## 2024-12-01 RX ADMIN — TETRACAINE HYDROCHLORIDE 2 DROP: 5 SOLUTION OPHTHALMIC at 01:12

## 2024-12-01 RX ADMIN — CLOSTRIDIUM TETANI TOXOID ANTIGEN (FORMALDEHYDE INACTIVATED), CORYNEBACTERIUM DIPHTHERIAE TOXOID ANTIGEN (FORMALDEHYDE INACTIVATED), BORDETELLA PERTUSSIS TOXOID ANTIGEN (GLUTARALDEHYDE INACTIVATED), BORDETELLA PERTUSSIS FILAMENTOUS HEMAGGLUTININ ANTIGEN (FORMALDEHYDE INACTIVATED), BORDETELLA PERTUSSIS PERTACTIN ANTIGEN, AND BORDETELLA PERTUSSIS FIMBRIAE 2/3 ANTIGEN 0.5 ML: 5; 2; 2.5; 5; 3; 5 INJECTION, SUSPENSION INTRAMUSCULAR at 12:12

## 2024-12-01 RX ADMIN — FLUORESCEIN SODIUM 1 EACH: 1 STRIP OPHTHALMIC at 01:12

## 2024-12-01 NOTE — DISCHARGE INSTRUCTIONS
Follow up with your primary care doctor. We will also send a referral to ophthalmology for follow up of your eye trauma. Return to the ED for new or worsening symptoms

## 2024-12-01 NOTE — ED NOTES
Pt cameto the ED with cc of a fall . Pt states she was sleeping and remembers having a dream, rolling over and falling off the bed.Pt stayed on the floor the rest of the night and  a family member found her on the floor in the same position. Pt denies any headaches, chest pain or back pain. P's eyes and lips are swollen, pt has visible bruising on left eye.

## 2024-12-01 NOTE — ED PROVIDER NOTES
Encounter Date: 12/1/2024       History     Chief Complaint   Patient presents with    Fall     Reports falling out of bed this morning. Presents awake, alert with c/o left periorbital edema and ecchymosis. Dried blood noted to face - unable to determine source of bleeding at this time. No active bleeding. Denies pain.     Patient is a 74-year-old female with past medical history significant for hypertension who presents for evaluation of injury sustained in a fall.  Patient reportedly rolled out of bed this morning around 3:00 a.m..  She fell striking the left side of her head and face on a bedside table.  She denies any loss of consciousness.  States she was unable to get off the ground for several hours without assistance as she had been come tangled in bed sheets.  She denies any prodrome of lightheadedness, chest pain, shortness of breath, fatigue prior to fall.     The history is provided by the patient.     Review of patient's allergies indicates:   Allergen Reactions    Codeine      Past Medical History:   Diagnosis Date    Essential (primary) hypertension      History reviewed. No pertinent surgical history.  No family history on file.  Social History     Tobacco Use    Smoking status: Never    Smokeless tobacco: Never     Review of Systems    Physical Exam     Initial Vitals [12/01/24 1200]   BP Pulse Resp Temp SpO2   108/74 65 20 97.8 °F (36.6 °C) 100 %      MAP       --         Physical Exam    Nursing note and vitals reviewed.  Constitutional: No distress.   HENT:   Head: Normocephalic. Mouth/Throat: Oropharynx is clear and moist.   Left periorbital edema with mild tenderness to palpation. Chronic poor dentition throughout, no loose teeth or evidence of fractured dentition   Eyes: EOM are normal. Pupils are equal, round, and reactive to light.   Left subconjunctival hemorrhage.  EOMI without pain. Visual fields intact bilaterally. Intraocular pressure 19 on left. Eyelid inverted without underlying  foreign body. No evidence of hyphema. Superficial linear <1 cm laceration to the inferolateral lower eyelid.    Neck: Neck supple. No tracheal deviation present. No JVD present.   Normal range of motion.  Cardiovascular:  Normal rate, regular rhythm and intact distal pulses.           No murmur heard.  Pulmonary/Chest: Breath sounds normal. No stridor. No respiratory distress. She has no wheezes. She has no rales.   Abdominal: Abdomen is soft. She exhibits no distension. There is no abdominal tenderness. There is no rebound.   Musculoskeletal:         General: Normal range of motion.      Cervical back: Normal range of motion and neck supple.      Comments: Full range of motion to the upper and lower extremities bilaterally without deformity.  No tenderness to palpation to the anterior chest wall, left upper extremity, right upper extremity, left lower extremity, or right lower extremity.  Full range of motion to the hips bilaterally without tenderness or deformity. No C, T, or L spine tenderness to palpation      Neurological: She is alert and oriented to person, place, and time. She has normal strength. No cranial nerve deficit or sensory deficit.   Skin: Skin is warm and dry. Capillary refill takes less than 2 seconds.         ED Course   Procedures  Labs Reviewed   CBC W/ AUTO DIFFERENTIAL - Abnormal       Result Value    WBC 14.57 (*)     RBC 3.53 (*)     Hemoglobin 10.0 (*)     Hematocrit 32.1 (*)     MCV 91      MCH 28.3      MCHC 31.2 (*)     RDW 14.3      Platelets 232      MPV 10.7      Immature Granulocytes 0.6 (*)     Gran # (ANC) 12.7 (*)     Immature Grans (Abs) 0.09 (*)     Lymph # 0.8 (*)     Mono # 0.9      Eos # 0.1      Baso # 0.06      nRBC 0      Gran % 86.8 (*)     Lymph % 5.3 (*)     Mono % 6.4      Eosinophil % 0.5      Basophil % 0.4      Differential Method Automated     COMPREHENSIVE METABOLIC PANEL - Abnormal    Sodium 145      Potassium 4.3      Chloride 113 (*)     CO2 22 (*)      Glucose 127 (*)     BUN 39 (*)     Creatinine 1.5 (*)     Calcium 9.2      Total Protein 7.9      Albumin 3.7      Total Bilirubin 1.4 (*)     Alkaline Phosphatase 81      AST 24      ALT 15      eGFR 37 (*)     Anion Gap 10     CK - Abnormal     (*)           Imaging Results              CT Cervical Spine Without Contrast (Final result)  Result time 12/01/24 13:22:12      Final result by Kasi Barry MD (12/01/24 13:22:12)                   Impression:      1. No acute intracranial findings.  2. Left supraorbital scalp hematoma.  No skull fracture.  3. Laceration of the left-sided preseptal periorbital soft tissues. Posttraumatic induration extends into the medial canthal region and pre malar region. Areas of increased attenuation involving the left cheek soft tissues could relate to small foreign body/debris, noting the presence of likely dermal calcifications in this region on the right. Correlation with physical examination recommended.  4. Periapical lucency about the bilateral maxillary central incisors, possibly displaced/avulsed teeth; correlation with physical examination recommended. Question minimally displaced avulsion fracture at the overlying buccal cortex of the maxillary alveolus superficial to the left maxillary central incisor and extending to the to tooth apex.  Additionally noted periapical lucency about the remaining left mandibular molar tooth, which could relate to periodontal inflammatory process/periapical abscess given relatively remote proximity to the posttraumatic change in the face, however correlation with direct visualization recommended to exclude acute dental injury at this site as well.  5. No acute cervical spine fracture.  6. Additional details, as provided in body of report.      Electronically signed by: Kasi Barry  Date:    12/01/2024  Time:    13:22               Narrative:    EXAMINATION:  CT HEAD WITHOUT CONTRAST; CT CERVICAL SPINE WITHOUT CONTRAST; CT  MAXILLOFACIAL WITHOUT CONTRAST    CLINICAL HISTORY:  fall;    TECHNIQUE:  Low dose axial images were obtained through the head, facial bones, and cervical spine.  Coronal and sagittal reformations were also performed. Contrast was not administered.    COMPARISON:  None.    FINDINGS:  Head:    Blood: No acute intracranial hemorrhage.    Parenchyma: No definite loss of gray-white differentiation to suggest acute or subacute transcortical infarct. Generalized pattern of age-related parenchymal volume loss.  Nonspecific areas of white hypoattenuation reflect sequela of chronic small vessel ischemic disease.    Ventricles/Extra-axial spaces: No abnormal extra-axial fluid collection. Basal cisterns are patent.    Vessels: Moderate to heavy atherosclerotic calcification.    Orbits: See below.    Scalp: Left frontal/orbital scalp hematoma extending to the preseptal periorbital region.    Skull: There are no depressed skull fractures or destructive bone lesions.    Sinuses and mastoids: Essentially clear.    Other findings: None    Facial bones:    Acute facial fractures: Dentition findings below.  Elsewhere, no additional facial bone fracture suggested.    Pterygoid plates, Zygomatic arches, Sphenotemporal buttresses: Intact.    Nasal Bones: No acute nasal bone fracture.    Mandible: The mandible is intact.    Dentition: No tooth fracture.  Periapical lucency about the bilateral maxillary central incisors, possibly displaced/avulsed teeth; correlation with physical examination recommended.  Question minimally displaced avulsion fracture at the overlying buccal cortex of the maxillary alveolus superficial to the left maxillary central incisor and extending to the to tooth apex (axial series 3, image 296; sagittal reformat series 602, image 117).  Periapical lucencies additionally noted involving the remaining left mandibular molar tooth.    Sinuses: There are no fluid levels in the sinuses.  Scattered paranasal sinus mucosal  thickening with retention cysts.    Imaged mastoids and middle ears: The imaged mastoid air cells and middle ear cavities are clear.    Imaged upper cervical spine: See below.    Facial soft tissues: As above.  Laceration of the left-sided preseptal periorbital soft tissues.  Posttraumatic induration extends into the medial canthal region and pre malar region.  Areas of increased attenuation involving the left cheek soft tissues could relate to small foreign body/debris, noting the presence of likely dermal calcifications in this region on the right.  Correlation with physical examination recommended.    Orbits: The bony orbits and orbital contents are atraumatic.    Imaged intracranial structures and upper aerodigestive tract: As above.    Cervical spine:    Fractures: No acute fractures    Alignment: There is no significant vertebral subluxation  Atlanto-axial and atlanto-occipital joints: Atlanto-axial and atlanto-occipital intervals are not widened.  Facet joints: There is no traumatic facet joint widening.  Vertebral bodies: Degenerative endplate change.  Discs: Degenerative disc disease.  Spinal canal and foraminal narrowing: Although CT does not optimally evaluate the soft tissue contents of the spinal canal and foramina, no critical stenosis is suggested.  Paraspinal soft tissues: Medialized retropharyngeal course of the common and internal carotid arteries.    Upper Lungs:Clear                                       CT Maxillofacial Without Contrast (Final result)  Result time 12/01/24 13:22:12      Final result by Kasi Barry MD (12/01/24 13:22:12)                   Impression:      1. No acute intracranial findings.  2. Left supraorbital scalp hematoma.  No skull fracture.  3. Laceration of the left-sided preseptal periorbital soft tissues. Posttraumatic induration extends into the medial canthal region and pre malar region. Areas of increased attenuation involving the left cheek soft tissues could  relate to small foreign body/debris, noting the presence of likely dermal calcifications in this region on the right. Correlation with physical examination recommended.  4. Periapical lucency about the bilateral maxillary central incisors, possibly displaced/avulsed teeth; correlation with physical examination recommended. Question minimally displaced avulsion fracture at the overlying buccal cortex of the maxillary alveolus superficial to the left maxillary central incisor and extending to the to tooth apex.  Additionally noted periapical lucency about the remaining left mandibular molar tooth, which could relate to periodontal inflammatory process/periapical abscess given relatively remote proximity to the posttraumatic change in the face, however correlation with direct visualization recommended to exclude acute dental injury at this site as well.  5. No acute cervical spine fracture.  6. Additional details, as provided in body of report.      Electronically signed by: Kasi Barry  Date:    12/01/2024  Time:    13:22               Narrative:    EXAMINATION:  CT HEAD WITHOUT CONTRAST; CT CERVICAL SPINE WITHOUT CONTRAST; CT MAXILLOFACIAL WITHOUT CONTRAST    CLINICAL HISTORY:  fall;    TECHNIQUE:  Low dose axial images were obtained through the head, facial bones, and cervical spine.  Coronal and sagittal reformations were also performed. Contrast was not administered.    COMPARISON:  None.    FINDINGS:  Head:    Blood: No acute intracranial hemorrhage.    Parenchyma: No definite loss of gray-white differentiation to suggest acute or subacute transcortical infarct. Generalized pattern of age-related parenchymal volume loss.  Nonspecific areas of white hypoattenuation reflect sequela of chronic small vessel ischemic disease.    Ventricles/Extra-axial spaces: No abnormal extra-axial fluid collection. Basal cisterns are patent.    Vessels: Moderate to heavy atherosclerotic calcification.    Orbits: See  below.    Scalp: Left frontal/orbital scalp hematoma extending to the preseptal periorbital region.    Skull: There are no depressed skull fractures or destructive bone lesions.    Sinuses and mastoids: Essentially clear.    Other findings: None    Facial bones:    Acute facial fractures: Dentition findings below.  Elsewhere, no additional facial bone fracture suggested.    Pterygoid plates, Zygomatic arches, Sphenotemporal buttresses: Intact.    Nasal Bones: No acute nasal bone fracture.    Mandible: The mandible is intact.    Dentition: No tooth fracture.  Periapical lucency about the bilateral maxillary central incisors, possibly displaced/avulsed teeth; correlation with physical examination recommended.  Question minimally displaced avulsion fracture at the overlying buccal cortex of the maxillary alveolus superficial to the left maxillary central incisor and extending to the to tooth apex (axial series 3, image 296; sagittal reformat series 602, image 117).  Periapical lucencies additionally noted involving the remaining left mandibular molar tooth.    Sinuses: There are no fluid levels in the sinuses.  Scattered paranasal sinus mucosal thickening with retention cysts.    Imaged mastoids and middle ears: The imaged mastoid air cells and middle ear cavities are clear.    Imaged upper cervical spine: See below.    Facial soft tissues: As above.  Laceration of the left-sided preseptal periorbital soft tissues.  Posttraumatic induration extends into the medial canthal region and pre malar region.  Areas of increased attenuation involving the left cheek soft tissues could relate to small foreign body/debris, noting the presence of likely dermal calcifications in this region on the right.  Correlation with physical examination recommended.    Orbits: The bony orbits and orbital contents are atraumatic.    Imaged intracranial structures and upper aerodigestive tract: As above.    Cervical spine:    Fractures: No acute  fractures    Alignment: There is no significant vertebral subluxation  Atlanto-axial and atlanto-occipital joints: Atlanto-axial and atlanto-occipital intervals are not widened.  Facet joints: There is no traumatic facet joint widening.  Vertebral bodies: Degenerative endplate change.  Discs: Degenerative disc disease.  Spinal canal and foraminal narrowing: Although CT does not optimally evaluate the soft tissue contents of the spinal canal and foramina, no critical stenosis is suggested.  Paraspinal soft tissues: Medialized retropharyngeal course of the common and internal carotid arteries.    Upper Lungs:Clear                                       CT Head Without Contrast (Final result)  Result time 12/01/24 13:22:12      Final result by Kasi Barry MD (12/01/24 13:22:12)                   Impression:      1. No acute intracranial findings.  2. Left supraorbital scalp hematoma.  No skull fracture.  3. Laceration of the left-sided preseptal periorbital soft tissues. Posttraumatic induration extends into the medial canthal region and pre malar region. Areas of increased attenuation involving the left cheek soft tissues could relate to small foreign body/debris, noting the presence of likely dermal calcifications in this region on the right. Correlation with physical examination recommended.  4. Periapical lucency about the bilateral maxillary central incisors, possibly displaced/avulsed teeth; correlation with physical examination recommended. Question minimally displaced avulsion fracture at the overlying buccal cortex of the maxillary alveolus superficial to the left maxillary central incisor and extending to the to tooth apex.  Additionally noted periapical lucency about the remaining left mandibular molar tooth, which could relate to periodontal inflammatory process/periapical abscess given relatively remote proximity to the posttraumatic change in the face, however correlation with direct visualization  recommended to exclude acute dental injury at this site as well.  5. No acute cervical spine fracture.  6. Additional details, as provided in body of report.      Electronically signed by: Kasi Barry  Date:    12/01/2024  Time:    13:22               Narrative:    EXAMINATION:  CT HEAD WITHOUT CONTRAST; CT CERVICAL SPINE WITHOUT CONTRAST; CT MAXILLOFACIAL WITHOUT CONTRAST    CLINICAL HISTORY:  fall;    TECHNIQUE:  Low dose axial images were obtained through the head, facial bones, and cervical spine.  Coronal and sagittal reformations were also performed. Contrast was not administered.    COMPARISON:  None.    FINDINGS:  Head:    Blood: No acute intracranial hemorrhage.    Parenchyma: No definite loss of gray-white differentiation to suggest acute or subacute transcortical infarct. Generalized pattern of age-related parenchymal volume loss.  Nonspecific areas of white hypoattenuation reflect sequela of chronic small vessel ischemic disease.    Ventricles/Extra-axial spaces: No abnormal extra-axial fluid collection. Basal cisterns are patent.    Vessels: Moderate to heavy atherosclerotic calcification.    Orbits: See below.    Scalp: Left frontal/orbital scalp hematoma extending to the preseptal periorbital region.    Skull: There are no depressed skull fractures or destructive bone lesions.    Sinuses and mastoids: Essentially clear.    Other findings: None    Facial bones:    Acute facial fractures: Dentition findings below.  Elsewhere, no additional facial bone fracture suggested.    Pterygoid plates, Zygomatic arches, Sphenotemporal buttresses: Intact.    Nasal Bones: No acute nasal bone fracture.    Mandible: The mandible is intact.    Dentition: No tooth fracture.  Periapical lucency about the bilateral maxillary central incisors, possibly displaced/avulsed teeth; correlation with physical examination recommended.  Question minimally displaced avulsion fracture at the overlying buccal cortex of the  maxillary alveolus superficial to the left maxillary central incisor and extending to the to tooth apex (axial series 3, image 296; sagittal reformat series 602, image 117).  Periapical lucencies additionally noted involving the remaining left mandibular molar tooth.    Sinuses: There are no fluid levels in the sinuses.  Scattered paranasal sinus mucosal thickening with retention cysts.    Imaged mastoids and middle ears: The imaged mastoid air cells and middle ear cavities are clear.    Imaged upper cervical spine: See below.    Facial soft tissues: As above.  Laceration of the left-sided preseptal periorbital soft tissues.  Posttraumatic induration extends into the medial canthal region and pre malar region.  Areas of increased attenuation involving the left cheek soft tissues could relate to small foreign body/debris, noting the presence of likely dermal calcifications in this region on the right.  Correlation with physical examination recommended.    Orbits: The bony orbits and orbital contents are atraumatic.    Imaged intracranial structures and upper aerodigestive tract: As above.    Cervical spine:    Fractures: No acute fractures    Alignment: There is no significant vertebral subluxation  Atlanto-axial and atlanto-occipital joints: Atlanto-axial and atlanto-occipital intervals are not widened.  Facet joints: There is no traumatic facet joint widening.  Vertebral bodies: Degenerative endplate change.  Discs: Degenerative disc disease.  Spinal canal and foraminal narrowing: Although CT does not optimally evaluate the soft tissue contents of the spinal canal and foramina, no critical stenosis is suggested.  Paraspinal soft tissues: Medialized retropharyngeal course of the common and internal carotid arteries.    Upper Lungs:Clear                                       Medications   TETRAcaine HCl (PF) 0.5 % Drop 2 drop (2 drops Left Eye Given by Provider 12/1/24 7057)   fluorescein ophthalmic strip 1 each (1  each Left Eye Given by Provider 12/1/24 1328)   Tdap vaccine injection 0.5 mL (0.5 mLs Intramuscular Given 12/1/24 1235)   sodium chloride 0.9% bolus 1,000 mL 1,000 mL (0 mLs Intravenous Stopped 12/1/24 1421)     Medical Decision Making  Pt presents for evaluation of injuries sustained in a fall. Noted to be down for several hours. Ddx: fracture, dislocation, SAH, EDH, SDH, orbital fracture, retrobulbar hematoma, globe rupture, corneal abrasion, rhabdo, COREY. No evidence of COREY or rhabdo on lab work. Tetanus updated. CT head and neck negative for acute process. CT face without evidence of orbital fracture. CT face also without evidence of dental fracture although possible dental avulsion. Pt without loose dentition on exam, doubt avulsion but in setting of trauma will send referral to dentistry for further evaluation and follow up. Subconjunctival hemorrhage noted on exam although no evidence of corneal abrasion. CT without evidence of retrobulbar hematoma. Physical exam and CT not consistent with globe rupture. Pt denies any complaint of pain or vision loss at this time. Pt ambulatory in the ED without gait abnormality. No pelvic pain. Will defer on extremity and hip imaging at this time. Plan to discharge to home to follow up with primary care and ophthalmology. Return precautions advised.    Amount and/or Complexity of Data Reviewed  Labs: ordered.  Radiology: ordered.    Risk  Prescription drug management.               ED Course as of 12/01/24 1816   Sun Dec 01, 2024   1428 Attestation. The patient was seen independently by me. Evaluation and disposition were discussed with me.  The patient is a 74-year-old female who fell out of bed around 3:00 a.m. this morning.  She was unable to get back off the floor.  On physical exam, she has pain and swelling to the left side of her face and her left eye.  No other injuries.  CT head maxillofacial and C-spine are negative for any fractures or dislocations.  She has a sub  conjunctival hematoma but no corneal abrasions and pupils are equal and reactive.  CPK is 391, creatinine is 1.5 which is baseline for the patient.  White count is 14.5 but the patient has no evidence of infection.  She has no lacerations requiring repair and she will be discharged. [ST]      ED Course User Index  [ST] Ashley Ontiveros MD                           Clinical Impression:  Final diagnoses:  [S05.12XA] Periorbital contusion of left eye, initial encounter (Primary)  [H11.30] Subconjunctival hemorrhage, unspecified laterality  [W19.XXXA] Fall, initial encounter  [S09.93XA] Dental trauma, initial encounter          ED Disposition Condition    Discharge           ED Prescriptions    None       Follow-up Information       Follow up With Specialties Details Why Contact Info    your primary care doctor  In 1 week                    Pablito Jacobo Jr., MD  Resident  12/01/24 1951

## 2024-12-01 NOTE — ED NOTES
MD at bedside observing pt's left eye. No visible scratches to the cornea. Pt's sclera blood shot.

## 2024-12-07 ENCOUNTER — HOSPITAL ENCOUNTER (INPATIENT)
Facility: HOSPITAL | Age: 74
LOS: 3 days | Discharge: HOME HEALTH | DRG: 189 | End: 2024-12-11
Attending: EMERGENCY MEDICINE | Admitting: INTERNAL MEDICINE
Payer: MEDICARE

## 2024-12-07 DIAGNOSIS — E66.01 MORBID OBESITY: ICD-10-CM

## 2024-12-07 DIAGNOSIS — R06.02 SHORTNESS OF BREATH: ICD-10-CM

## 2024-12-07 DIAGNOSIS — R06.2 WHEEZING: Primary | ICD-10-CM

## 2024-12-07 DIAGNOSIS — J96.01 ACUTE HYPOXIC RESPIRATORY FAILURE: ICD-10-CM

## 2024-12-07 DIAGNOSIS — J06.9 VIRAL URI WITH COUGH: ICD-10-CM

## 2024-12-07 DIAGNOSIS — N17.9 AKI (ACUTE KIDNEY INJURY): ICD-10-CM

## 2024-12-07 DIAGNOSIS — I50.31 ACUTE HEART FAILURE WITH PRESERVED EJECTION FRACTION (HFPEF): ICD-10-CM

## 2024-12-07 DIAGNOSIS — R06.02 SOB (SHORTNESS OF BREATH): ICD-10-CM

## 2024-12-07 DIAGNOSIS — L03.213 PRESEPTAL CELLULITIS: ICD-10-CM

## 2024-12-07 DIAGNOSIS — E66.2 MORBID (SEVERE) OBESITY WITH ALVEOLAR HYPOVENTILATION: ICD-10-CM

## 2024-12-07 DIAGNOSIS — I10 PRIMARY HYPERTENSION: ICD-10-CM

## 2024-12-07 DIAGNOSIS — I27.20 PULMONARY HYPERTENSION: ICD-10-CM

## 2024-12-07 PROBLEM — D64.9 NORMOCYTIC ANEMIA: Status: ACTIVE | Noted: 2024-12-07

## 2024-12-07 PROBLEM — R29.6 FALLS: Status: ACTIVE | Noted: 2024-12-07

## 2024-12-07 PROBLEM — E87.5 HYPERKALEMIA: Status: ACTIVE | Noted: 2024-12-07

## 2024-12-07 PROBLEM — Z79.4 TYPE 2 DIABETES MELLITUS WITHOUT COMPLICATION, WITH LONG-TERM CURRENT USE OF INSULIN: Status: ACTIVE | Noted: 2024-12-07

## 2024-12-07 PROBLEM — E11.9 TYPE 2 DIABETES MELLITUS WITHOUT COMPLICATION, WITH LONG-TERM CURRENT USE OF INSULIN: Status: ACTIVE | Noted: 2024-12-07

## 2024-12-07 LAB
ALBUMIN SERPL BCP-MCNC: 3.4 G/DL (ref 3.5–5.2)
ALP SERPL-CCNC: 74 U/L (ref 40–150)
ALT SERPL W/O P-5'-P-CCNC: 19 U/L (ref 10–44)
ANION GAP SERPL CALC-SCNC: 10 MMOL/L (ref 8–16)
ANION GAP SERPL CALC-SCNC: 12 MMOL/L (ref 8–16)
AST SERPL-CCNC: 31 U/L (ref 10–40)
BASOPHILS # BLD AUTO: 0.05 K/UL (ref 0–0.2)
BASOPHILS NFR BLD: 0.4 % (ref 0–1.9)
BILIRUB SERPL-MCNC: 1.2 MG/DL (ref 0.1–1)
BILIRUB UR QL STRIP: NEGATIVE
BNP SERPL-MCNC: 179 PG/ML (ref 0–99)
BUN SERPL-MCNC: 17 MG/DL (ref 8–23)
BUN SERPL-MCNC: 19 MG/DL (ref 8–23)
CALCIUM SERPL-MCNC: 9.1 MG/DL (ref 8.7–10.5)
CALCIUM SERPL-MCNC: 9.1 MG/DL (ref 8.7–10.5)
CHLORIDE SERPL-SCNC: 111 MMOL/L (ref 95–110)
CHLORIDE SERPL-SCNC: 114 MMOL/L (ref 95–110)
CLARITY UR: CLEAR
CO2 SERPL-SCNC: 19 MMOL/L (ref 23–29)
CO2 SERPL-SCNC: 20 MMOL/L (ref 23–29)
COLOR UR: YELLOW
CREAT SERPL-MCNC: 1.2 MG/DL (ref 0.5–1.4)
CREAT SERPL-MCNC: 1.2 MG/DL (ref 0.5–1.4)
DIFFERENTIAL METHOD BLD: ABNORMAL
EOSINOPHIL # BLD AUTO: 0.4 K/UL (ref 0–0.5)
EOSINOPHIL NFR BLD: 2.8 % (ref 0–8)
ERYTHROCYTE [DISTWIDTH] IN BLOOD BY AUTOMATED COUNT: 14.8 % (ref 11.5–14.5)
EST. GFR  (NO RACE VARIABLE): 48 ML/MIN/1.73 M^2
EST. GFR  (NO RACE VARIABLE): 48 ML/MIN/1.73 M^2
ESTIMATED AVG GLUCOSE: 105 MG/DL (ref 68–131)
GLUCOSE SERPL-MCNC: 161 MG/DL (ref 70–110)
GLUCOSE SERPL-MCNC: 93 MG/DL (ref 70–110)
GLUCOSE UR QL STRIP: NEGATIVE
HBA1C MFR BLD: 5.3 % (ref 4–5.6)
HCT VFR BLD AUTO: 31.1 % (ref 37–48.5)
HCV AB SERPL QL IA: NORMAL
HGB BLD-MCNC: 9.9 G/DL (ref 12–16)
HGB UR QL STRIP: NEGATIVE
HIV 1+2 AB+HIV1 P24 AG SERPL QL IA: NORMAL
IMM GRANULOCYTES # BLD AUTO: 0.07 K/UL (ref 0–0.04)
IMM GRANULOCYTES NFR BLD AUTO: 0.5 % (ref 0–0.5)
INFLUENZA A, MOLECULAR: NEGATIVE
INFLUENZA B, MOLECULAR: NEGATIVE
KETONES UR QL STRIP: NEGATIVE
LEUKOCYTE ESTERASE UR QL STRIP: NEGATIVE
LYMPHOCYTES # BLD AUTO: 0.8 K/UL (ref 1–4.8)
LYMPHOCYTES NFR BLD: 5.5 % (ref 18–48)
MCH RBC QN AUTO: 29 PG (ref 27–31)
MCHC RBC AUTO-ENTMCNC: 31.8 G/DL (ref 32–36)
MCV RBC AUTO: 91 FL (ref 82–98)
MONOCYTES # BLD AUTO: 0.9 K/UL (ref 0.3–1)
MONOCYTES NFR BLD: 6.6 % (ref 4–15)
NEUTROPHILS # BLD AUTO: 11.9 K/UL (ref 1.8–7.7)
NEUTROPHILS NFR BLD: 84.2 % (ref 38–73)
NITRITE UR QL STRIP: NEGATIVE
NRBC BLD-RTO: 0 /100 WBC
PH UR STRIP: 6 [PH] (ref 5–8)
PLATELET # BLD AUTO: 247 K/UL (ref 150–450)
PMV BLD AUTO: 11.2 FL (ref 9.2–12.9)
POTASSIUM SERPL-SCNC: 4.9 MMOL/L (ref 3.5–5.1)
POTASSIUM SERPL-SCNC: 5.5 MMOL/L (ref 3.5–5.1)
PROT SERPL-MCNC: 7.8 G/DL (ref 6–8.4)
PROT UR QL STRIP: NEGATIVE
RBC # BLD AUTO: 3.41 M/UL (ref 4–5.4)
SARS-COV-2 RDRP RESP QL NAA+PROBE: NEGATIVE
SODIUM SERPL-SCNC: 142 MMOL/L (ref 136–145)
SODIUM SERPL-SCNC: 144 MMOL/L (ref 136–145)
SP GR UR STRIP: 1.01 (ref 1–1.03)
SPECIMEN SOURCE: NORMAL
TROPONIN I SERPL DL<=0.01 NG/ML-MCNC: <0.006 NG/ML (ref 0–0.03)
URN SPEC COLLECT METH UR: NORMAL
UROBILINOGEN UR STRIP-ACNC: NEGATIVE EU/DL
WBC # BLD AUTO: 14.06 K/UL (ref 3.9–12.7)

## 2024-12-07 PROCEDURE — 94640 AIRWAY INHALATION TREATMENT: CPT | Mod: XB

## 2024-12-07 PROCEDURE — 84484 ASSAY OF TROPONIN QUANT: CPT | Performed by: EMERGENCY MEDICINE

## 2024-12-07 PROCEDURE — 85025 COMPLETE CBC W/AUTO DIFF WBC: CPT | Performed by: EMERGENCY MEDICINE

## 2024-12-07 PROCEDURE — 80053 COMPREHEN METABOLIC PANEL: CPT | Performed by: EMERGENCY MEDICINE

## 2024-12-07 PROCEDURE — 25000003 PHARM REV CODE 250

## 2024-12-07 PROCEDURE — 99900035 HC TECH TIME PER 15 MIN (STAT)

## 2024-12-07 PROCEDURE — 63600175 PHARM REV CODE 636 W HCPCS

## 2024-12-07 PROCEDURE — 96376 TX/PRO/DX INJ SAME DRUG ADON: CPT

## 2024-12-07 PROCEDURE — 94644 CONT INHLJ TX 1ST HOUR: CPT

## 2024-12-07 PROCEDURE — 94761 N-INVAS EAR/PLS OXIMETRY MLT: CPT

## 2024-12-07 PROCEDURE — 27000221 HC OXYGEN, UP TO 24 HOURS

## 2024-12-07 PROCEDURE — 87389 HIV-1 AG W/HIV-1&-2 AB AG IA: CPT

## 2024-12-07 PROCEDURE — 87635 SARS-COV-2 COVID-19 AMP PRB: CPT | Performed by: EMERGENCY MEDICINE

## 2024-12-07 PROCEDURE — 36415 COLL VENOUS BLD VENIPUNCTURE: CPT

## 2024-12-07 PROCEDURE — 96375 TX/PRO/DX INJ NEW DRUG ADDON: CPT

## 2024-12-07 PROCEDURE — 96372 THER/PROPH/DIAG INJ SC/IM: CPT

## 2024-12-07 PROCEDURE — 25000242 PHARM REV CODE 250 ALT 637 W/ HCPCS: Performed by: EMERGENCY MEDICINE

## 2024-12-07 PROCEDURE — 96365 THER/PROPH/DIAG IV INF INIT: CPT

## 2024-12-07 PROCEDURE — 87502 INFLUENZA DNA AMP PROBE: CPT | Performed by: EMERGENCY MEDICINE

## 2024-12-07 PROCEDURE — 25000242 PHARM REV CODE 250 ALT 637 W/ HCPCS

## 2024-12-07 PROCEDURE — G0378 HOSPITAL OBSERVATION PER HR: HCPCS

## 2024-12-07 PROCEDURE — 80048 BASIC METABOLIC PNL TOTAL CA: CPT | Mod: XB

## 2024-12-07 PROCEDURE — 96366 THER/PROPH/DIAG IV INF ADDON: CPT

## 2024-12-07 PROCEDURE — 81003 URINALYSIS AUTO W/O SCOPE: CPT | Performed by: EMERGENCY MEDICINE

## 2024-12-07 PROCEDURE — 83036 HEMOGLOBIN GLYCOSYLATED A1C: CPT

## 2024-12-07 PROCEDURE — 93005 ELECTROCARDIOGRAM TRACING: CPT

## 2024-12-07 PROCEDURE — 93010 ELECTROCARDIOGRAM REPORT: CPT | Mod: ,,, | Performed by: INTERNAL MEDICINE

## 2024-12-07 PROCEDURE — 99285 EMERGENCY DEPT VISIT HI MDM: CPT | Mod: 25

## 2024-12-07 PROCEDURE — 83880 ASSAY OF NATRIURETIC PEPTIDE: CPT | Performed by: EMERGENCY MEDICINE

## 2024-12-07 PROCEDURE — 86803 HEPATITIS C AB TEST: CPT

## 2024-12-07 PROCEDURE — 94640 AIRWAY INHALATION TREATMENT: CPT

## 2024-12-07 RX ORDER — VANCOMYCIN 1.75 GRAM/500 ML IN 0.9 % SODIUM CHLORIDE INTRAVENOUS
1750
Status: DISCONTINUED | OUTPATIENT
Start: 2024-12-08 | End: 2024-12-09

## 2024-12-07 RX ORDER — LANOLIN ALCOHOL/MO/W.PET/CERES
1 CREAM (GRAM) TOPICAL
COMMUNITY

## 2024-12-07 RX ORDER — IPRATROPIUM BROMIDE AND ALBUTEROL SULFATE 2.5; .5 MG/3ML; MG/3ML
3 SOLUTION RESPIRATORY (INHALATION)
Status: DISCONTINUED | OUTPATIENT
Start: 2024-12-07 | End: 2024-12-07

## 2024-12-07 RX ORDER — ALBUTEROL SULFATE 2.5 MG/.5ML
15 SOLUTION RESPIRATORY (INHALATION)
Status: COMPLETED | OUTPATIENT
Start: 2024-12-07 | End: 2024-12-07

## 2024-12-07 RX ORDER — DOXAZOSIN 2 MG/1
8 TABLET ORAL NIGHTLY
Status: DISCONTINUED | OUTPATIENT
Start: 2024-12-07 | End: 2024-12-11 | Stop reason: HOSPADM

## 2024-12-07 RX ORDER — AMLODIPINE BESYLATE 10 MG/1
10 TABLET ORAL DAILY
COMMUNITY

## 2024-12-07 RX ORDER — ATORVASTATIN CALCIUM 40 MG/1
40 TABLET, FILM COATED ORAL DAILY
Status: DISCONTINUED | OUTPATIENT
Start: 2024-12-08 | End: 2024-12-07

## 2024-12-07 RX ORDER — ATORVASTATIN CALCIUM 40 MG/1
40 TABLET, FILM COATED ORAL NIGHTLY
Status: DISCONTINUED | OUTPATIENT
Start: 2024-12-07 | End: 2024-12-11 | Stop reason: HOSPADM

## 2024-12-07 RX ORDER — CLOPIDOGREL BISULFATE 75 MG/1
75 TABLET ORAL DAILY
Status: DISCONTINUED | OUTPATIENT
Start: 2024-12-08 | End: 2024-12-11 | Stop reason: HOSPADM

## 2024-12-07 RX ORDER — ATORVASTATIN CALCIUM 40 MG/1
40 TABLET, FILM COATED ORAL DAILY
COMMUNITY

## 2024-12-07 RX ORDER — ATENOLOL 25 MG/1
100 TABLET ORAL DAILY
Status: DISCONTINUED | OUTPATIENT
Start: 2024-12-08 | End: 2024-12-11 | Stop reason: HOSPADM

## 2024-12-07 RX ORDER — ERYTHROMYCIN 5 MG/G
OINTMENT OPHTHALMIC EVERY 6 HOURS
Status: DISCONTINUED | OUTPATIENT
Start: 2024-12-07 | End: 2024-12-11 | Stop reason: HOSPADM

## 2024-12-07 RX ORDER — LOSARTAN POTASSIUM 100 MG/1
100 TABLET ORAL DAILY
COMMUNITY

## 2024-12-07 RX ORDER — SERTRALINE HYDROCHLORIDE 50 MG/1
50 TABLET, FILM COATED ORAL DAILY
COMMUNITY

## 2024-12-07 RX ORDER — AMLODIPINE BESYLATE 5 MG/1
10 TABLET ORAL DAILY
Status: DISCONTINUED | OUTPATIENT
Start: 2024-12-08 | End: 2024-12-11 | Stop reason: HOSPADM

## 2024-12-07 RX ORDER — PREDNISONE 20 MG/1
40 TABLET ORAL DAILY
Status: DISCONTINUED | OUTPATIENT
Start: 2024-12-07 | End: 2024-12-07

## 2024-12-07 RX ORDER — ENOXAPARIN SODIUM 100 MG/ML
30 INJECTION SUBCUTANEOUS EVERY 24 HOURS
Status: DISCONTINUED | OUTPATIENT
Start: 2024-12-07 | End: 2024-12-09

## 2024-12-07 RX ORDER — DOXAZOSIN 8 MG/1
8 TABLET ORAL DAILY
COMMUNITY

## 2024-12-07 RX ORDER — CLOPIDOGREL BISULFATE 75 MG/1
75 TABLET ORAL DAILY
Status: ON HOLD | COMMUNITY
End: 2024-12-11 | Stop reason: HOSPADM

## 2024-12-07 RX ORDER — DOXAZOSIN 2 MG/1
8 TABLET ORAL DAILY
Status: DISCONTINUED | OUTPATIENT
Start: 2024-12-08 | End: 2024-12-07

## 2024-12-07 RX ORDER — ATENOLOL 100 MG/1
100 TABLET ORAL DAILY
COMMUNITY

## 2024-12-07 RX ORDER — ASPIRIN 81 MG/1
81 TABLET ORAL DAILY
Status: ON HOLD | COMMUNITY
End: 2024-12-11 | Stop reason: HOSPADM

## 2024-12-07 RX ORDER — FUROSEMIDE 10 MG/ML
20 INJECTION INTRAMUSCULAR; INTRAVENOUS ONCE
Status: COMPLETED | OUTPATIENT
Start: 2024-12-07 | End: 2024-12-07

## 2024-12-07 RX ORDER — LOSARTAN POTASSIUM 50 MG/1
100 TABLET ORAL DAILY
Status: DISCONTINUED | OUTPATIENT
Start: 2024-12-08 | End: 2024-12-11 | Stop reason: HOSPADM

## 2024-12-07 RX ORDER — SERTRALINE HYDROCHLORIDE 50 MG/1
50 TABLET, FILM COATED ORAL DAILY
Status: DISCONTINUED | OUTPATIENT
Start: 2024-12-08 | End: 2024-12-11 | Stop reason: HOSPADM

## 2024-12-07 RX ADMIN — IPRATROPIUM BROMIDE AND ALBUTEROL SULFATE 3 ML: .5; 3 SOLUTION RESPIRATORY (INHALATION) at 04:12

## 2024-12-07 RX ADMIN — PREDNISONE 40 MG: 20 TABLET ORAL at 03:12

## 2024-12-07 RX ADMIN — FUROSEMIDE 20 MG: 10 INJECTION, SOLUTION INTRAMUSCULAR; INTRAVENOUS at 03:12

## 2024-12-07 RX ADMIN — VANCOMYCIN HYDROCHLORIDE 2250 MG: 500 INJECTION, POWDER, LYOPHILIZED, FOR SOLUTION INTRAVENOUS at 06:12

## 2024-12-07 RX ADMIN — DOXAZOSIN 8 MG: 2 TABLET ORAL at 09:12

## 2024-12-07 RX ADMIN — ENOXAPARIN SODIUM 30 MG: 30 INJECTION SUBCUTANEOUS at 04:12

## 2024-12-07 RX ADMIN — ERYTHROMYCIN: 5 OINTMENT OPHTHALMIC at 11:12

## 2024-12-07 RX ADMIN — ATORVASTATIN CALCIUM 40 MG: 40 TABLET, FILM COATED ORAL at 09:12

## 2024-12-07 RX ADMIN — ALBUTEROL SULFATE 15 MG: 2.5 SOLUTION RESPIRATORY (INHALATION) at 10:12

## 2024-12-07 RX ADMIN — SODIUM ZIRCONIUM CYCLOSILICATE 5 G: 5 POWDER, FOR SUSPENSION ORAL at 03:12

## 2024-12-07 RX ADMIN — ERYTHROMYCIN: 5 OINTMENT OPHTHALMIC at 06:12

## 2024-12-07 NOTE — ED PROVIDER NOTES
Encounter Date: 12/7/2024       History     Chief Complaint   Patient presents with    Shortness of Breath     The pt presents to the ED from home with a complaint of intermittent SOB that started this morning.       Patient is a 74-year-old female presenting to the ED from home with a complaint of intermittent shortness of breath over the past couple of weeks.  She has had an occasional dry cough.  No fever or chills.  No chest pain.      Review of patient's allergies indicates:   Allergen Reactions    Codeine      Past Medical History:   Diagnosis Date    Essential (primary) hypertension      History reviewed. No pertinent surgical history.  No family history on file.  Social History     Tobacco Use    Smoking status: Never    Smokeless tobacco: Never     Review of Systems   Constitutional:  Negative for fever.   Respiratory:  Positive for cough and shortness of breath.    Cardiovascular:  Negative for chest pain.   All other systems reviewed and are negative.      Physical Exam     Initial Vitals [12/07/24 0912]   BP Pulse Resp Temp SpO2   (!) 146/103 67 20 98.6 °F (37 °C) (!) 93 %      MAP       --         Physical Exam    Nursing note and vitals reviewed.  Constitutional: No distress.   Eyes:   Left subconjunctival hemorrhage.   Cardiovascular:  Normal rate and regular rhythm.           Pulmonary/Chest:   Coarse with faint expiratory wheezing bilaterally.   Abdominal: Abdomen is soft. There is no abdominal tenderness.   Musculoskeletal:         General: No edema.     Neurological: She is alert and oriented to person, place, and time.   Skin: Skin is warm and dry.   Psychiatric: Thought content normal.         ED Course   Critical Care    Date/Time: 12/7/2024 2:15 PM    Performed by: Colton Freed MD  Authorized by: Colton Freed MD  Direct patient critical care time: 60 minutes  Additional history critical care time: 5 minutes  Ordering / reviewing critical care time: 15  minutes  Documentation critical care time: 15 minutes  Consulting other physicians critical care time: 5 minutes  Total critical care time (exclusive of procedural time) : 100 minutes  Critical care was time spent personally by me on the following activities: examination of patient, discussions with primary provider, obtaining history from patient or surrogate, ordering and performing treatments and interventions, ordering and review of laboratory studies, ordering and review of radiographic studies, pulse oximetry, re-evaluation of patient's condition and review of old charts.        Labs Reviewed   CBC W/ AUTO DIFFERENTIAL - Abnormal       Result Value    WBC 14.06 (*)     RBC 3.41 (*)     Hemoglobin 9.9 (*)     Hematocrit 31.1 (*)     MCV 91      MCH 29.0      MCHC 31.8 (*)     RDW 14.8 (*)     Platelets 247      MPV 11.2      Immature Granulocytes 0.5      Gran # (ANC) 11.9 (*)     Immature Grans (Abs) 0.07 (*)     Lymph # 0.8 (*)     Mono # 0.9      Eos # 0.4      Baso # 0.05      nRBC 0      Gran % 84.2 (*)     Lymph % 5.5 (*)     Mono % 6.6      Eosinophil % 2.8      Basophil % 0.4      Differential Method Automated     COMPREHENSIVE METABOLIC PANEL - Abnormal    Sodium 144      Potassium 5.5 (*)     Chloride 114 (*)     CO2 20 (*)     Glucose 93      BUN 17      Creatinine 1.2      Calcium 9.1      Total Protein 7.8      Albumin 3.4 (*)     Total Bilirubin 1.2 (*)     Alkaline Phosphatase 74      AST 31      ALT 19      eGFR 48 (*)     Anion Gap 10     B-TYPE NATRIURETIC PEPTIDE - Abnormal     (*)    INFLUENZA A & B BY MOLECULAR    Influenza A, Molecular Negative      Influenza B, Molecular Negative      Flu A & B Source Nasal swab     SARS-COV-2 RNA AMPLIFICATION, QUAL    SARS-CoV-2 RNA, Amplification, Qual Negative     TROPONIN I    Troponin I <0.006     URINALYSIS          Imaging Results              X-Ray Chest 1 View (Final result)  Result time 12/07/24 10:56:21      Final result by Jay  Hussain FREITAS MD (12/07/24 10:56:21)                   Impression:      As above      Electronically signed by: Hussain Thompson MD  Date:    12/07/2024  Time:    10:56               Narrative:    EXAMINATION:  XR CHEST 1 VIEW    CLINICAL HISTORY:  Shortness of breath;    TECHNIQUE:  Frontal view of the chest was performed.    COMPARISON:  12/18/2018    FINDINGS:  The cardiomediastinal silhouette is upper normal in size.  Atherosclerotic calcification of the aortic arch.  Pulmonary vascularity appears within normal limits.    The lungs appear symmetrically expanded, unchanged from prior.  No new confluent pulmonary parenchymal opacity evident on this single projection.  No large volume pleural fluid or pneumothorax.                                       Medications   albuterol sulfate nebulizer solution 15 mg (15 mg Nebulization Given 12/7/24 1038)     Medical Decision Making  Emergent evaluation of a 74-year-old female with shortness of breath over the past couple of weeks.  Patient was noted to be wheezing upon arrival to the ED but says she has no history COPD.  She was given an hour long breathing treatment but still noted to be wheezing afterwards.  Based on this I feel the patient will require admission for further treatment and evaluation.  This has been discussed with the [facility] residents.    Amount and/or Complexity of Data Reviewed  Labs: ordered.     Details: CBC with a white blood cell count of 14.06.  BNP is 178.  Influenza and COVID swabs are negative.  Troponin is 0.  Radiology: ordered.     Details: Chest x-ray appears unchanged from prior.  Discussion of management or test interpretation with external provider(s): I have discussed the patient's symptoms as well as pertinent lab values and x-ray findings with the [facility] resident.    Risk  Prescription drug management.                                      Clinical Impression:  Final diagnoses:  [R06.02] SOB (shortness of  breath)  [R06.2] Wheezing (Primary)          ED Disposition Condition    Observation Stable                Colton Freed MD  12/07/24 9824

## 2024-12-07 NOTE — PHARMACY MED REC
"  [facility]           Pharmacy  Admission Medication History     The home medication history was taken by Nancy Bender.      Medication history obtained from Medications listed below were obtained from: Patient/family.    Based on information gathered for medication list, you may go to "Admission" then "Reconcile Home Medications" tabs to review and/or act upon those items.     The home medication list has been updated by the Pharmacy department.   Please read ALL comments highlighted in yellow.   Please address this information as you see fit.    Feel free to contact us if you have any questions or require assistance.    The medications listed below were removed from the home medication list.  Please reorder if appropriate:    Patient reports NOT TAKING the following medication(s):  Benzonatate 200 mg cap  Bactroban oint 2 %  Zofran-odt 4 mg TbDL    No current facility-administered medications on file prior to encounter.     Current Outpatient Medications on File Prior to Encounter   Medication Sig Dispense Refill    albuterol (PROVENTIL/VENTOLIN HFA) 90 mcg/actuation inhaler Inhale 1-2 puffs into the lungs every 6 (six) hours as needed for Wheezing. Rescue 1 Inhaler 0    amLODIPine (NORVASC) 10 MG tablet Take 10 mg by mouth once daily.      aspirin (ECOTRIN) 81 MG EC tablet Take 81 mg by mouth once daily.      atenoloL (TENORMIN) 100 MG tablet Take 100 mg by mouth once daily.      atorvastatin (LIPITOR) 40 MG tablet Take 40 mg by mouth once daily.      clopidogreL (PLAVIX) 75 mg tablet Take 75 mg by mouth once daily.      doxazosin (CARDURA) 8 MG Tab Take 8 mg by mouth once daily.      ergocalciferol, vitamin D2, (VITAMIN D ORAL) Take 1 tablet by mouth once daily.      ferrous sulfate (FEOSOL) Tab tablet Take 1 tablet by mouth daily with breakfast.      insulin glargine,hum.rec.anlog (LANTUS SOLOSTAR U-100 INSULIN SUBQ) Inject 15 Units into the skin every evening.      losartan (COZAAR) 100 " MG tablet Take 100 mg by mouth once daily.      sertraline (ZOLOFT) 50 MG tablet Take 50 mg by mouth once daily.      TURMERIC ORAL Take 1 tablet by mouth once daily.         Please address this information as you see fit.  Feel free to contact us if you have any questions or require assistance.    Nancy Bender  [phone]                .

## 2024-12-07 NOTE — ED NOTES
Pt arrived to ED with c/o SOB that started approximately 2 weeks ago intermittently and worsened this morning. Per EMS, pt's O2 saturation at 88% enroute, placed on 2L NC. Pt reports that she is on blood thinners. Pt reports that she was admitted for fall approximately 1 week ago. Pt sustained laceration to left eye that's been treated. Pt has bruising around left eye and on right anterior chest s/p fall. Pt changed in gown, placed on cardiac monitoring, automatic BP, and pulse ox. Denies pain, fever, chills, congestion. Pt reports that she lives with her son and granddaughter. Call light within reach.

## 2024-12-07 NOTE — MEDICAL/APP STUDENT
[facility] Medicine H&P Note     Admitting Team: [facility] Hospitalist Team A  Attending Physician: Azeb Avendano MD  Resident: Giovanny  Intern: Re    Date of Admit: 12/7/2024    Chief Complaint     Shortness breath    Subjective:      History of Present Illness:  Christine Delgadillo is a 74 y.o. female with a PMHx of HTN, T2DM, HLD, obesity with BMI 40, chronic cough. The patient presented on 12/7/2024 to [facility] ED with a chief complaint of shortness of breath. Patient reports she has been experiencing SOB for the past 2 weeks. She reports having exertional dyspnea, and she is out of breath after walking around the house. She uses a walker to ambulate, and does not ambulate outside the house much. SOB worsened this morning which led her to come to the ED. En route to the ED, patient's O2 sat was at 88% and placed on 2L NC. Patient also complains of chronic dry cough.       In the ED, patient was given albuterol breathing treatment. Patient continued to sat at 87% on 3.5L NC and continue to wheeze on physical exam. She was recently presented to the ED on 12/01 from a periorbital contusion from a fall from the bed during a sleep. She denies fever, chills n/v, CP, abdominal pain, focal weakness, orthopnea.      Past Medical History:  Past Medical History:   Diagnosis Date    Essential (primary) hypertension        Past Surgical History:  History reviewed. No pertinent surgical history.    Social History:  Tobacco: Former smoker. Quit on 1999. Smoked about 1 pack/wk for 30 years.  Alcohol: drinks about 2 daiquiri/week  Drugs: denies    Family History:  Colon cancer in brother in his 40's  Heart attack in father    Home Medications:  Sertraline 50 mg  Losartan 100 mg  Tessalon 200 mg  Amylodipine 10 mg  Atenolol 100 mg  Atorvastatin 40mg  Plavix 75mg  Doxazosin 8mg  Insulin 15 unit at night         Allergies:  Review of patient's allergies indicates:   Allergen Reactions    Codeine        Review of  "Systems:  Review of Systems   Constitutional:  Negative for chills and fever.   Eyes:  Positive for discharge. Negative for blurred vision, double vision and photophobia.   Respiratory:  Positive for cough (chronic dry cough), shortness of breath and wheezing.    Cardiovascular:  Negative for chest pain and leg swelling.   Gastrointestinal:  Positive for diarrhea (usual watery stool. Most recent watery stool 2 days ago). Negative for nausea and vomiting.   Genitourinary:  Negative for dysuria and hematuria.   Musculoskeletal:  Negative for back pain.   Neurological:  Negative for dizziness, sensory change, weakness and headaches.       Health Care Maintenance :   Primary Care Physician: Pastora Jensen FNP     Immunizations:   Immunization History   Administered Date(s) Administered    Td (ADULT) 2024    Tdap 2024        Cancer Screening:  MMG: last year   Colonoscopy: never done    Objective:   Last 24 Hour Vital Signs:  BP  Min: 146/103  Max: 162/72  Temp  Av.6 °F (37 °C)  Min: 98.5 °F (36.9 °C)  Max: 98.6 °F (37 °C)  Pulse  Av.3  Min: 63  Max: 67  Resp  Av.7  Min: 18  Max: 20  SpO2  Av.3 %  Min: 93 %  Max: 94 %  Height  Av' 3" (160 cm)  Min: 5' 3" (160 cm)  Max: 5' 3" (160 cm)  Weight  Av.3 kg (230 lb)  Min: 104.3 kg (230 lb)  Max: 104.3 kg (230 lb)  Body mass index is 40.74 kg/m².  No intake/output data recorded.    Physical Examination:  Physical Exam  Vitals and nursing note reviewed.   Constitutional:       General: She is in acute distress.      Appearance: Normal appearance.   HENT:      Head: Normocephalic and atraumatic.      Nose: Nose normal.   Eyes:      General:         Left eye: Discharge present.     Comments: L sclera bloodshot/bright red    L eye purulent    Cardiovascular:      Rate and Rhythm: Normal rate and regular rhythm.      Pulses: Normal pulses.   Pulmonary:      Effort: Respiratory distress (end expiratory wheeze bilaterally) present.      " "Breath sounds: Wheezing present.   Abdominal:      General: Abdomen is flat. There is no distension.      Palpations: Abdomen is soft.   Musculoskeletal:         General: No swelling.      Cervical back: Normal range of motion.   Skin:     General: Skin is warm.      Capillary Refill: Capillary refill takes 2 to 3 seconds.      Findings: Bruising (L periorbital) present.   Neurological:      General: No focal deficit present.      Mental Status: She is alert and oriented to person, place, and time.      Cranial Nerves: No cranial nerve deficit.          Laboratory:  Most Recent Data:  CBC:   Lab Results   Component Value Date    WBC 14.06 (H) 12/07/2024    HGB 9.9 (L) 12/07/2024    HCT 31.1 (L) 12/07/2024     12/07/2024    MCV 91 12/07/2024    RDW 14.8 (H) 12/07/2024       BMP:   Lab Results   Component Value Date     12/07/2024    K 5.5 (H) 12/07/2024     (H) 12/07/2024    CO2 20 (L) 12/07/2024    BUN 17 12/07/2024    CREATININE 1.2 12/07/2024    GLU 93 12/07/2024    CALCIUM 9.1 12/07/2024     LFTs:   Lab Results   Component Value Date    PROT 7.8 12/07/2024    ALBUMIN 3.4 (L) 12/07/2024    BILITOT 1.2 (H) 12/07/2024    AST 31 12/07/2024    ALKPHOS 74 12/07/2024    ALT 19 12/07/2024     Coags: No results found for: "INR", "PROTIME", "PTT"  FLP: No results found for: "CHOL", "HDL", "LDLCALC", "TRIG", "CHOLHDL"  DM:   Lab Results   Component Value Date    CREATININE 1.2 12/07/2024     Thyroid: No results found for: "TSH", "FREET4", "G9LCOJA", "Y8XVKRP", "THYROIDAB"  Anemia: No results found for: "IRON", "TIBC", "FERRITIN", "MYSPEGBF43", "FOLATE"  Cardiac:   Lab Results   Component Value Date    TROPONINI <0.006 12/07/2024     (H) 12/07/2024     Urinalysis:   Lab Results   Component Value Date    COLORU Yellow 12/07/2024    SPECGRAV 1.015 12/07/2024    NITRITE Negative 12/07/2024    KETONESU Negative 12/07/2024    UROBILINOGEN Negative 12/07/2024       Microbiology Data:   Flu A & B " negative    Radiology:  Chest XR: upper normal in size. Symmetric. No pleural fluid or pneumothorax    Other Results:  EKG (my interpretation): unable to access file    Assessment:     Christine Delgadillo is a 74 y.o. female with a PMHx of HTN, T2DM, HLD, history of cigarette smoking presenting with SOB. Patient does not have a diagnosed history of COPD but has a history of cigarette smoking with 5 pack year. Patient also reports history of exertional dyspnea and shortness of breath that has been going on for about 2 weeks that became worse today.      Plan:     Acute hypoxic respiratory failure   - Possible COPD vs HFpEF; Patient has a 5 pack year history of cigarette use in the past. The CXR is in the upper normal in size which is concerning for possible COPD.   - on admission, patient's O2 sat is 87% on 3.5L NC. End expiratory wheezing auscultated bilaterally   - bedside echo in the ED did not show any obvious signs of decreased EF; difficult to visual due to body habitus  Plan:  - cardiac workup ordered: troponin, BNP, lipid panel, ECG; no signs of acute cardiac emergencies at this time  - continue nebulization treatment  - CXR to rule out any acute pulmonary changes  - possible COPD exacerbation; treat with steroids and azithromycin 500mg for 3 days empirically  - continue nebulizer treatment  - plan for TTE to better visualize her cardiac status  - furosemide 20mg IV given.    Recent Fall/Trauma  #periorbital contusion  #preseptal cellulitis/infection  - he was recently presented to the ED on 12/01 from a periorbital contusion from a fall from the bed during a sleep.  - L eye is edematous and purulent; Patient denies any vision changes or pain with eye movement  Plan:  - possible antibiotics treatment: start augmentin 875 BID    Leukocytosis  - possible infection of the L eye  Plan:  - start antibiotics therapy  - monitor daily CBC    Hypertension  - initial blood pressure 162/72  Plan:  Continue home Losartan  -  continue to monitor BP    HLD:  Plan:  - lipid panal  - continue home lipitor    T2DM  - patient takes 15 units of insulin nightly  Plan:  - monitor POCT  - sliding scale insulin     CKD3A  - patient's eGFR 48 and Cr 1.2 on admission (around her baseline)  Plan:  Continue to monitor     Obesity:  - BMI 40.74  Plan:  Outpatient follow up on discharge      Ppx: Lovenox  Diet: diabetic diet  Code: Full    Disposition: Pending evaluation and treatment of her acute respiratory distress      Selvineyeon [facility]del[facility] MS4    \[facility] [place] [facility]\"" Medicine Hospitalist Pager numbers:   \[facility] [place] [facility]\"" Hospitalist Medicine Team A (Dominic/Romana): [phone]  \[facility] [place] [facility]\"" Hospitalist Medicine Team B (Catracho/Raymond):  [phone]

## 2024-12-07 NOTE — H&P
U Medicine History and Physical  [facility]    Admitting Team: Team A Attending: Colton Freed,*   Resident: Giovanny San Intern: Kathy Burkett MD     Date of Admit: 12/7/2024    Chief Complaint and Duration     Shortness of breath    Subjective      History of Present Illness:  Christine Delgadillo is a 74 y.o. female with Pmhx  HTN, shortness of breath, falls and morbid obesity who presented to [facility] on 12/7/2024 for SOB.     The patient was in their usual state of health until approximately 2 weeks ago where she started to have SOB intermittently and worsened this morning. Per EMS, pt's O2 saturation at 88% enroute, wheezing and placed on 2L NC. Given albuterol with mild improvement.     During formal evaluation, pt states she was in her normal state of health until 1 week ago when she noticed she was increasingly SOB beyond her baseline SOB. Since yesterday, felt progressively worse which led her to go tot the ED. Denies chest pain, palpitations, subjective fevers, chills, n/v/d. Denies sick contacts or recent travel.     Of note this pt was here on 12/1 for a traumatic fall, injury to the left side of her head and face on a bedside table. She denies any loss of consciousness. Stated that she did not trip over anything. Sustained a left periorbital contusion. Was discharged home and recommended to follow up with primary care and ophthalmology.     Review of Systems:  Review HPI  All other systems reviewed and negative    Past Medical History:  Hypertension  Hyperlipidemia  Morbid obesity    Past Surgical History:  History reviewed. No pertinent surgical history.    Allergies:  Review of patient's allergies indicates:   Allergen Reactions    Codeine        Home Medications:  Prior to Admission medications    Medication Sig Start Date End Date Taking? Authorizing Provider   albuterol (PROVENTIL/VENTOLIN HFA) 90 mcg/actuation inhaler Inhale 1-2 puffs into the lungs every 6 (six)  hours as needed for Wheezing. Rescue 12/18/18   Juvenal Garcia MD   benzonatate (TESSALON) 200 MG capsule Take 1 capsule (200 mg total) by mouth 3 (three) times daily as needed for Cough. 12/18/18   Juvenal Garcia MD   mupirocin (BACTROBAN) 2 % ointment Apply topically 3 (three) times daily. 2/9/24   Catie Jeter PA-C   ondansetron (ZOFRAN-ODT) 4 MG TbDL Take 1 tablet (4 mg total) by mouth every 6 (six) hours as needed (nausea). 3/6/23   Jovany Schaeffer MD   Atenolol 100mg PO once daily  Amlodipine 10mg PO once daily  Aspirin 81mg PO daily  Clopidogrel 75mg once daily  Doxazosin 8mg once daily  Vitamin D 1 tablet daily  Ferrous sulfate 1 tablet daily  Lantus 15 units into the skin every evening  Losartan 100mg PO once daily  Sertraline 50mg once daily      Family History:  No family history on file.    Social History:  Tobacco:   Tobacco Use: Low Risk  (12/7/2024)    Patient History     Smoking Tobacco Use: quit 1999     Smokeless Tobacco Use: Never     Passive Exposure: Not on file     EtOH:   None    Illicits:   Social History     Substance and Sexual Activity   Drug Use none Not on file      Occupation: Data Unavailable.      Health Maintaince :   Primary Care Physician: Pastora Jensen FNP    Immunizations:   Currently on File within the [facility]:   Most Recent Immunizations   Administered Date(s) Administered    Td (ADULT) 02/09/2024    Tdap 12/01/2024     TDap:   []2024, UTD  Influenza:  []Not completed, not UTD  Pneumovax:  []Not completed, not UTD  COVID-19:  []    Cancer Screening:  PAP:   []unknown  Mammogram:  []2023  Colonoscopy:  []Not UTD, never completed     Objective     Physical Examination:  Physical Exam   Gen: NAD, normal appearance,obese  HEENT: NC/AT, EOMI grossly, normal external ears bilaterally, normal external nose  Neck: trachea midline  CV: Regular rate, regular rhythm, no murmur appreciated today. 2+ radial pulses  Resp:   Normal work of breathing. Normal effort. Equal  "chest rise. No respiratory distress, wheezing on 3.5L via NC  Abd: Soft, nontender, nondistended, no rebound/guarding  MSK/Ext: No clubbing, cyanosis, or edema. Moves all four extremities spontaneously.   Neuro: no gross neurological deficits noted, moving extremities spontaneously  Skin: Warm, dry, no rash   Triage: BP: (!) 146/103  Pulse: 67  Temp: 98.6 °F (37 °C)  Resp: 20  Height: 5' 3" (160 cm)  Weight: 104.3 kg (230 lb)  BMI (Calculated): 40.8  SpO2: (!) 93 %  Exam: BP (!) 162/72 (BP Location: Left arm)   Pulse 63   Temp 98.5 °F (36.9 °C) (Oral)   Resp 18   Ht 5' 3" (1.6 m)   Wt 104.3 kg (230 lb)   SpO2 (!) 93%   BMI 40.74 kg/m²   Body mass index is 40.74 kg/m².     Laboratory:  Lab Results   Component Value Date    WBC 14.06 (H) 12/07/2024    HGB 9.9 (L) 12/07/2024    MCV 91 12/07/2024    MCH 29.0 12/07/2024    MCHC 31.8 (L) 12/07/2024    RDW 14.8 (H) 12/07/2024     12/07/2024    MPV 11.2 12/07/2024     Lab Results   Component Value Date    CREATININE 1.2 12/07/2024    BUN 17 12/07/2024     12/07/2024    K 5.5 (H) 12/07/2024     (H) 12/07/2024    CO2 20 (L) 12/07/2024           All laboratory data reviewed    Microbiology Data:  Microbiology Results (last 7 days)       Procedure Component Value Units Date/Time    Influenza A & B by Molecular [[id number]] Collected: 12/07/24 0959    Order Status: Completed Specimen: Nasopharyngeal Swab Updated: 12/07/24 1049     Influenza A, Molecular Negative     Influenza B, Molecular Negative     Flu A & B Source Nasal swab            EKG:  No results found for this or any previous visit.     I have personally reviewed the above EKG    Radiology:  X-Ray Chest 1 View   Final Result      As above         Electronically signed by: Hussain Thompson MD   Date:    12/07/2024   Time:    10:56           I have personally reviewed the above imaging    Assessment/Plan     Christine Delgadillo is a 74 y.o. female with PMHx of HTN, falls and morbid obesity who " presented to [facility] on 12/7/2024 for SOB x 1 week, worsened in last 24hr.  Patient is admitted to [facility] for respiratory workup for SOB.     SOB  Wheezing   Concern for new HFpEF vs Upper Respiratory Infection  Pt states she is intermittently SOB at baseline to the degree of being limited to mobility bound within the home. Additionally with chronic, nonproductive cough intermittently at baseline. Uses a walker. Pt endorses worsened SOB for a week and progressively worsen x 1 day. Likely given remote hx of tobacco use, large body habitus pt with underlying restrictive lung disease and  possibly complicated by an acute upper viral infection. However this pt did not have resolution of symptoms with therapy in ED so further evaluation warranted broadening differential to a cardiac cause. POCUS completed at bedside with limited view given body habitus, but did not see obvious fluid or concerningly low EF.   -flu, COVID neg  -   -CXR WNL  -TTE ordered  -supplemental oxygen as needed and wean as tolerated; if remains hypoxic or unable to wean oxygen, complete 6 min walk test prior to discharge    Electrolyte derangements  Hyperkalemia  -CTM BMP    Recurrent Fall  Pt described drop attacks without any prodromal symptoms. Additionally denied any tripping or mechanical causes for falls. With multiple falls as recent of last week.  -EKG  -order TTE    Left Periorbital contusion, POA  Preseptal orbital cellulitis  With periorbital edema, sclera is blood shot, with periorbital bruising noted and yellowish green purulent drainage from skin opening. No proptosis, ophthalmoplegia or vision impairment  -ordered vancomycin, ordered erythromycin ointment  -continue previous recommendation of following up with ophthalmology outpatient    HTN  -reported home medication atenolol 100mg daily, amlodipine 10mg daily, losartan 100 daily, doxazosin    HLD  -reported home medication atorvastatin 40mg daily    Obesity  -consider  referral to pulm outpatient for sleep study  -consider referral to bariatrics for weight loss management    HCM  -recommend colonoscopy  -recommend immunizations, pt declined Flu/COVID/Pneumovax/RSV      Diet: diabetic  Code: .full  DVT ppx: lovenox  Dispo: home when stable  Estimated LOS: 1 day      Kathy Burkett MD  [facility] PGY-I  [facility] Service Team A

## 2024-12-08 PROBLEM — R06.2 WHEEZING: Status: ACTIVE | Noted: 2024-12-08

## 2024-12-08 PROBLEM — E66.01 MORBID OBESITY: Status: ACTIVE | Noted: 2024-12-08

## 2024-12-08 LAB
ALBUMIN SERPL BCP-MCNC: 3.2 G/DL (ref 3.5–5.2)
ALP SERPL-CCNC: 69 U/L (ref 40–150)
ALT SERPL W/O P-5'-P-CCNC: 20 U/L (ref 10–44)
ANION GAP SERPL CALC-SCNC: 10 MMOL/L (ref 8–16)
AST SERPL-CCNC: 29 U/L (ref 10–40)
BASOPHILS # BLD AUTO: 0.02 K/UL (ref 0–0.2)
BASOPHILS NFR BLD: 0.1 % (ref 0–1.9)
BILIRUB SERPL-MCNC: 1.4 MG/DL (ref 0.1–1)
BUN SERPL-MCNC: 22 MG/DL (ref 8–23)
CALCIUM SERPL-MCNC: 9 MG/DL (ref 8.7–10.5)
CHLORIDE SERPL-SCNC: 111 MMOL/L (ref 95–110)
CHOLEST SERPL-MCNC: 137 MG/DL (ref 120–199)
CHOLEST/HDLC SERPL: 3 {RATIO} (ref 2–5)
CO2 SERPL-SCNC: 20 MMOL/L (ref 23–29)
CREAT SERPL-MCNC: 1.2 MG/DL (ref 0.5–1.4)
DIFFERENTIAL METHOD BLD: ABNORMAL
EOSINOPHIL # BLD AUTO: 0 K/UL (ref 0–0.5)
EOSINOPHIL NFR BLD: 0.1 % (ref 0–8)
ERYTHROCYTE [DISTWIDTH] IN BLOOD BY AUTOMATED COUNT: 14.7 % (ref 11.5–14.5)
EST. GFR  (NO RACE VARIABLE): 48 ML/MIN/1.73 M^2
FERRITIN SERPL-MCNC: 608 NG/ML (ref 20–300)
FOLATE SERPL-MCNC: 8.7 NG/ML (ref 4–24)
GLUCOSE SERPL-MCNC: 167 MG/DL (ref 70–110)
HCT VFR BLD AUTO: 30.3 % (ref 37–48.5)
HDLC SERPL-MCNC: 46 MG/DL (ref 40–75)
HDLC SERPL: 33.6 % (ref 20–50)
HGB BLD-MCNC: 9.6 G/DL (ref 12–16)
IMM GRANULOCYTES # BLD AUTO: 0.09 K/UL (ref 0–0.04)
IMM GRANULOCYTES NFR BLD AUTO: 0.6 % (ref 0–0.5)
IRON SERPL-MCNC: 22 UG/DL (ref 30–160)
LDLC SERPL CALC-MCNC: 78 MG/DL (ref 63–159)
LYMPHOCYTES # BLD AUTO: 0.8 K/UL (ref 1–4.8)
LYMPHOCYTES NFR BLD: 5.5 % (ref 18–48)
MCH RBC QN AUTO: 28.6 PG (ref 27–31)
MCHC RBC AUTO-ENTMCNC: 31.7 G/DL (ref 32–36)
MCV RBC AUTO: 90 FL (ref 82–98)
MONOCYTES # BLD AUTO: 0.8 K/UL (ref 0.3–1)
MONOCYTES NFR BLD: 5.4 % (ref 4–15)
NEUTROPHILS # BLD AUTO: 13.3 K/UL (ref 1.8–7.7)
NEUTROPHILS NFR BLD: 88.3 % (ref 38–73)
NONHDLC SERPL-MCNC: 91 MG/DL
NRBC BLD-RTO: 0 /100 WBC
PLATELET # BLD AUTO: 242 K/UL (ref 150–450)
PMV BLD AUTO: 10.6 FL (ref 9.2–12.9)
POCT GLUCOSE: 142 MG/DL (ref 70–110)
POCT GLUCOSE: 147 MG/DL (ref 70–110)
POCT GLUCOSE: 150 MG/DL (ref 70–110)
POTASSIUM SERPL-SCNC: 4.5 MMOL/L (ref 3.5–5.1)
PROT SERPL-MCNC: 7.1 G/DL (ref 6–8.4)
RBC # BLD AUTO: 3.36 M/UL (ref 4–5.4)
SATURATED IRON: 9 % (ref 20–50)
SODIUM SERPL-SCNC: 141 MMOL/L (ref 136–145)
TOTAL IRON BINDING CAPACITY: 246 UG/DL (ref 250–450)
TRANSFERRIN SERPL-MCNC: 166 MG/DL (ref 200–375)
TRIGL SERPL-MCNC: 65 MG/DL (ref 30–150)
VIT B12 SERPL-MCNC: 596 PG/ML (ref 210–950)
WBC # BLD AUTO: 15.11 K/UL (ref 3.9–12.7)

## 2024-12-08 PROCEDURE — 63600175 PHARM REV CODE 636 W HCPCS

## 2024-12-08 PROCEDURE — 25000003 PHARM REV CODE 250

## 2024-12-08 PROCEDURE — 82728 ASSAY OF FERRITIN: CPT

## 2024-12-08 PROCEDURE — 99900035 HC TECH TIME PER 15 MIN (STAT)

## 2024-12-08 PROCEDURE — 36415 COLL VENOUS BLD VENIPUNCTURE: CPT

## 2024-12-08 PROCEDURE — 82607 VITAMIN B-12: CPT

## 2024-12-08 PROCEDURE — 80053 COMPREHEN METABOLIC PANEL: CPT

## 2024-12-08 PROCEDURE — 27000221 HC OXYGEN, UP TO 24 HOURS

## 2024-12-08 PROCEDURE — 83540 ASSAY OF IRON: CPT

## 2024-12-08 PROCEDURE — 80061 LIPID PANEL: CPT

## 2024-12-08 PROCEDURE — 82746 ASSAY OF FOLIC ACID SERUM: CPT

## 2024-12-08 PROCEDURE — 94761 N-INVAS EAR/PLS OXIMETRY MLT: CPT

## 2024-12-08 PROCEDURE — 85025 COMPLETE CBC W/AUTO DIFF WBC: CPT

## 2024-12-08 PROCEDURE — 11000001 HC ACUTE MED/SURG PRIVATE ROOM

## 2024-12-08 RX ORDER — INSULIN GLARGINE 100 [IU]/ML
7 INJECTION, SOLUTION SUBCUTANEOUS NIGHTLY
Status: DISCONTINUED | OUTPATIENT
Start: 2024-12-08 | End: 2024-12-11 | Stop reason: HOSPADM

## 2024-12-08 RX ORDER — GLUCAGON 1 MG
1 KIT INJECTION
Status: DISCONTINUED | OUTPATIENT
Start: 2024-12-08 | End: 2024-12-11 | Stop reason: HOSPADM

## 2024-12-08 RX ORDER — IBUPROFEN 200 MG
24 TABLET ORAL
Status: DISCONTINUED | OUTPATIENT
Start: 2024-12-08 | End: 2024-12-11 | Stop reason: HOSPADM

## 2024-12-08 RX ORDER — FUROSEMIDE 10 MG/ML
40 INJECTION INTRAMUSCULAR; INTRAVENOUS ONCE
Status: COMPLETED | OUTPATIENT
Start: 2024-12-08 | End: 2024-12-08

## 2024-12-08 RX ORDER — IBUPROFEN 200 MG
16 TABLET ORAL
Status: DISCONTINUED | OUTPATIENT
Start: 2024-12-08 | End: 2024-12-11 | Stop reason: HOSPADM

## 2024-12-08 RX ADMIN — ATENOLOL 100 MG: 25 TABLET ORAL at 09:12

## 2024-12-08 RX ADMIN — ERYTHROMYCIN: 5 OINTMENT OPHTHALMIC at 05:12

## 2024-12-08 RX ADMIN — ERYTHROMYCIN: 5 OINTMENT OPHTHALMIC at 12:12

## 2024-12-08 RX ADMIN — INSULIN GLARGINE 7 UNITS: 100 INJECTION, SOLUTION SUBCUTANEOUS at 08:12

## 2024-12-08 RX ADMIN — ENOXAPARIN SODIUM 30 MG: 30 INJECTION SUBCUTANEOUS at 05:12

## 2024-12-08 RX ADMIN — VANCOMYCIN HYDROCHLORIDE 1750 MG: 500 INJECTION, POWDER, LYOPHILIZED, FOR SOLUTION INTRAVENOUS at 06:12

## 2024-12-08 RX ADMIN — DOXAZOSIN 8 MG: 2 TABLET ORAL at 08:12

## 2024-12-08 RX ADMIN — LOSARTAN POTASSIUM 100 MG: 50 TABLET, FILM COATED ORAL at 09:12

## 2024-12-08 RX ADMIN — CLOPIDOGREL BISULFATE 75 MG: 75 TABLET ORAL at 09:12

## 2024-12-08 RX ADMIN — ERYTHROMYCIN: 5 OINTMENT OPHTHALMIC at 11:12

## 2024-12-08 RX ADMIN — SERTRALINE HYDROCHLORIDE 50 MG: 50 TABLET ORAL at 09:12

## 2024-12-08 RX ADMIN — FUROSEMIDE 40 MG: 10 INJECTION, SOLUTION INTRAMUSCULAR; INTRAVENOUS at 10:12

## 2024-12-08 RX ADMIN — ATORVASTATIN CALCIUM 40 MG: 40 TABLET, FILM COATED ORAL at 08:12

## 2024-12-08 RX ADMIN — AMLODIPINE BESYLATE 10 MG: 5 TABLET ORAL at 09:12

## 2024-12-08 NOTE — MEDICAL/APP STUDENT
"[facility] Medicine Progress Note    Primary Team: [facility] Hospitalist Team A  Attending Physician: Azeb Avendano MD  Resident: Giovanny  Intern: Re    Date of Admit: 2024     Chief Complaint: Shortness of breath    Subjective/Interval Events:      NAEON. /68 this morning. Patient reports SOB improved since yesterday. She is comfortably sitting on bed and eating her breakfast. No complaints at this time.      Objective:   Last 24 Hour Vital Signs:  BP  Min: 144/51  Max: 172/74  Temp  Av.8 °F (37.1 °C)  Min: 98.1 °F (36.7 °C)  Max: 100 °F (37.8 °C)  Pulse  Av.5  Min: 63  Max: 78  Resp  Av.4  Min: 18  Max: 29  SpO2  Av.2 %  Min: 90 %  Max: 96 %  Height  Av' 3" (160 cm)  Min: 5' 3" (160 cm)  Max: 5' 3" (160 cm)  Weight  Av.3 kg (230 lb)  Min: 104.3 kg (230 lb)  Max: 104.3 kg (230 lb)    Intake/Output Summary (Last 24 hours) at 2024 0728  Last data filed at 2024 0622  Gross per 24 hour   Intake 437.87 ml   Output 600 ml   Net -162.13 ml       Physical Examination:  Physical Exam  Constitutional:       Appearance: She is obese.   HENT:      Head: Normocephalic.   Eyes:      General:         Left eye: Discharge (Yellow/green discharge) present.     Pupils: Pupils are equal, round, and reactive to light.      Comments: L sclera bloodshot/bright red   Cardiovascular:      Rate and Rhythm: Normal rate.      Pulses: Normal pulses.      Heart sounds: Normal heart sounds.   Pulmonary:      Breath sounds: Wheezing present.   Abdominal:      General: Abdomen is flat.      Palpations: Abdomen is soft.      Tenderness: There is no abdominal tenderness.   Musculoskeletal:      Cervical back: Normal range of motion.      Right lower leg: No edema.      Left lower leg: No edema.   Skin:     General: Skin is warm.      Findings: Bruising (L eye) present.   Neurological:      General: No focal deficit present.      Mental Status: She is alert and oriented to person, place, and time. "      Cranial Nerves: No cranial nerve deficit.   Psychiatric:         Mood and Affect: Mood normal.          Laboratory:  Recent Labs   Lab 12/01/24  1220 12/07/24  1005 12/07/24 2024   WBC 14.57* 14.06*  --    HGB 10.0* 9.9*  --    HCT 32.1* 31.1*  --     247  --    MCV 91 91  --    RDW 14.3 14.8*  --     144 142   K 4.3 5.5* 4.9   * 114* 111*   CO2 22* 20* 19*   BUN 39* 17 19   CREATININE 1.5* 1.2 1.2   * 93 161*   PROT 7.9 7.8  --    ALBUMIN 3.7 3.4*  --    BILITOT 1.4* 1.2*  --    AST 24 31  --    ALKPHOS 81 74  --    ALT 15 19  --        Microbiology:  Influenza A and B negative    Imaging:  CXR: no acute changes. Lung is upper normal in size. Symmetric. No pleural fluid or pneumothorax    Current Medications:     Scheduled:   amLODIPine  10 mg Oral Daily    atenoloL  100 mg Oral Daily    atorvastatin  40 mg Oral QHS    clopidogreL  75 mg Oral Daily    doxazosin  8 mg Oral QHS    enoxparin  30 mg Subcutaneous Daily    erythromycin   Left Eye Q6H    losartan  100 mg Oral Daily    sertraline  50 mg Oral Daily    vancomycin (VANCOCIN) IV (PEDS and ADULTS)  1,750 mg Intravenous Q24H         Infusions:       PRN:    Current Facility-Administered Medications:     Pharmacy to dose Vancomycin consult, , , Once **AND** vancomycin - pharmacy to dose, , Intravenous, pharmacy to manage frequency    Assessment:     Christine Delgadillo is a 74 y.o. female with a PMHx of HTN, T2DM, HLD, history of cigarette smoking presenting with SOB. Patient does not have a diagnosed history of COPD but has a history of cigarette smoking with 5 pack year. Patient also reports history of exertional dyspnea and shortness of breath that has been going on for about 2 weeks that became worse today.      Plan:     Acute hypoxic respiratory failure    Pt states she is intermittently SOB at baseline to the degree of being limited to mobility bound within the home. Additionally with chronic, nonproductive cough intermittently  at baseline. Uses a walker. Pt endorses worsened SOB for a week and progressively worsen x 1 day. Likely given remote hx of tobacco use, large body habitus pt with underlying restrictive lung disease and  possibly complicated by an acute upper viral infection. However this pt did not have resolution of symptoms with therapy in ED so further evaluation warranted broadening differential to a cardiac cause. POCUS completed at bedside with limited view given body habitus, but did not see obvious fluid or concerningly low EF.   - flu, COVID neg  -   - CXR no acute findings on admission  - supplemental O2 as tolerated     Recent Fall/Trauma  #periorbital contusion  #preseptal cellulitis/infection  - he was recently presented to the ED on 12/01 from a periorbital contusion from a fall from the bed during a sleep.  - L eye is edematous and purulent; Patient denies any vision changes or pain with eye movement  Plan:  - vancomycin dosed by pharmacy for possible preseptal cellulitis covering MRSA     Leukocytosis  - possible infection of the L eye  Plan:  - start antibiotics therapy  - monitor daily CBC     Hypertension  - initial blood pressure 162/72  Plan:  Continue home Losartan  - continue to monitor BP     HLD:  Plan:  - lipid panal  - continue home lipitor     T2DM  - patient takes 15 units of insulin nightly  Plan:  - monitor POCT  - sliding scale insulin      CKD3A  - patient's eGFR 48 and Cr 1.2 on admission (around her baseline)  Plan:  Continue to monitor      Obesity:  - BMI 40.74  Plan:  Outpatient follow up on discharge    Diet: Diabetic diet   Code: Full  Dispo: Home when stable      Jaeyeon Kweon [facility] MS4       [facility] Medicine Hospitalist Pager numbers:   [facility] Hospitalist Medicine Team A (Dominic/Romana): [phone]  [facility] Hospitalist Medicine Team B (Catracho/Raymond):  [phone]

## 2024-12-08 NOTE — PROGRESS NOTES
"[facility] Medicine Progress Note    Primary Team: [facility] Hospitalist Team A  Attending Physician: Azeb Avendano MD  Resident: Mena  Intern: Kwadwo    Subjective:      NAEON. This morning patient sitting up in bed, comfortable. States breathing has improved with diuresis. Only 0.8Ls measured overnight. Denies any pain around eye or change in vision     Objective:     Last 24 Hour Vital Signs:  BP  Min: 144/51  Max: 172/74  Temp  Av.8 °F (37.1 °C)  Min: 98.1 °F (36.7 °C)  Max: 100 °F (37.8 °C)  Pulse  Av.8  Min: 63  Max: 78  Resp  Av.3  Min: 18  Max: 29  SpO2  Av.1 %  Min: 90 %  Max: 96 %  Height  Av' 3" (160 cm)  Min: 5' 3" (160 cm)  Max: 5' 3" (160 cm)  Weight  Av.3 kg (230 lb)  Min: 104.3 kg (230 lb)  Max: 104.3 kg (230 lb)  I/O last 3 completed shifts:  In: 437.9 [IV Piggyback:437.9]  Out: 600 [Urine:600]    Physical Examination:    BP (!) 189/77   Pulse 69   Temp 98.3 °F (36.8 °C)   Resp 20   Ht 5' 3" (1.6 m)   Wt 104.3 kg (230 lb)   SpO2 (!) 92%   BMI 40.74 kg/m²     General Appearance:    Alert, cooperative, no distress, appears stated age   Head:    Bruising around left orbit with skin breakdown. Small green discharge at corners of the eye.    Eyes:    PERRL, conjunctival hemorrhage present in Left , EOM's intact, fundi     benign, both eyes   Ears:    Normal TM's and external ear canals, both ears   Nose:   Nares normal, septum midline, mucosa normal, no drainage    or sinus tenderness   Throat:   Lips, mucosa, and tongue normal; missing teeth, gold caps on incisors    Neck:   Supple, symmetrical, trachea midline, no adenopathy;     thyroid:  no enlargement/tenderness/nodules; no carotid    bruit or JVD   Back:     Symmetric, no curvature, ROM normal, no CVA tenderness   Lungs:     End expiratory wheeze present, rhonchi present b/l   Chest Wall:    No tenderness or deformity    Heart:    Regular rate and rhythm, S1 and S2 normal, no murmur, rub   or gallop     " Patient reports fever today. States he is an oncology patient and was instructed to be evaluated in the ED. Reports diarrhea over the past couple of days. Also endorses some mild shortness of breath.     Triage Assessment     Row Name 03/22/23 0009       Triage Assessment (Adult)    Airway WDL WDL       Respiratory WDL    Respiratory WDL X;rhythm/pattern    Rhythm/Pattern, Respiratory dyspnea on exertion       Skin Circulation/Temperature WDL    Skin Circulation/Temperature WDL WDL       Cardiac WDL    Cardiac WDL X;rhythm    Pulse Rate & Regularity apical pulse irregular;radial pulse irregular;tachycardic       Peripheral/Neurovascular WDL    Peripheral Neurovascular WDL WDL       Cognitive/Neuro/Behavioral WDL    Cognitive/Neuro/Behavioral WDL WDL                 Abdomen:     Soft, non-tender, bowel sounds active all four quadrants,     no masses, no organomegaly           Extremities:   Extremities normal, atraumatic, no cyanosis or edema   Pulses:   2+ and symmetric all extremities   Skin:   Skin color, texture, turgor normal, no rashes or lesions   Lymph nodes:   Cervical, supraclavicular, and axillary nodes normal   Neurologic:   CNII-XII intact, normal strength, sensation and reflexes     throughout         Other Results:  EKG (my interpretation): .sinus rate and rhythm, Normal intervals, normal axis, no acute ST or TWA    Current Medications:     Infusions:       Scheduled:   amLODIPine  10 mg Oral Daily    atenoloL  100 mg Oral Daily    atorvastatin  40 mg Oral QHS    clopidogreL  75 mg Oral Daily    doxazosin  8 mg Oral QHS    enoxparin  30 mg Subcutaneous Daily    erythromycin   Left Eye Q6H    losartan  100 mg Oral Daily    sertraline  50 mg Oral Daily    vancomycin (VANCOCIN) IV (PEDS and ADULTS)  1,750 mg Intravenous Q24H        PRN:    Current Facility-Administered Medications:     Pharmacy to dose Vancomycin consult, , , Once **AND** vancomycin - pharmacy to dose, , Intravenous, pharmacy to manage frequency    Antibiotics and Day Number of Therapy:  None      Assessment:      Christine Delgadillo is a 74 y.o. female with PMHx of HTN, falls and morbid obesity who presented to [facility] on 12/7/2024 for SOB x 1 week, worsened in last 24hr.  Patient is admitted to [facility] for respiratory workup for SOB.     Plan:     Acute Hypoxic Respiratory Failure  SOB  Wheezing   Concern for new HFpEF vs Upper Respiratory Infection  Pt states she is intermittently SOB at baseline to the degree of being limited to mobility bound within the home. Additionally with chronic, nonproductive cough intermittently at baseline. Uses a walker. Pt endorses worsened SOB for a week and progressively worsen x 1 day. Likely given remote hx of tobacco use, large body habitus pt with underlying  restrictive lung disease and  possibly complicated by an acute upper viral infection. However this pt did not have resolution of symptoms with therapy in ED so further evaluation warranted broadening differential to a cardiac cause. POCUS completed at bedside with limited view given body habitus, but did not see obvious fluid or concerningly low EF.   - flu, COVID neg  -    - CXR WNL  - TTE ordered  - supplemental oxygen as needed and wean as tolerated; if remains hypoxic or unable to wean oxygen, complete 6 min walk test prior to discharge  - continuing diuresis with lasix 40 IV     Electrolyte derangements  Hyperkalemia  - CTM BMP     Recurrent Fall  Pt described drop attacks without any prodromal symptoms. Additionally denied any tripping or mechanical causes for falls. With multiple falls as recent of last week.  - EKG wnl  - ordered TTE     Left Periorbital contusion, POA  Preseptal orbital cellulitis  With periorbital edema, sclera is blood shot, with periorbital bruising noted and yellowish green purulent drainage from skin opening. No proptosis, ophthalmoplegia or vision impairment  - ordered vancomycin, ordered erythromycin ointment  - continue previous recommendation of following up with ophthalmology outpatient     HTN  - reported home medication atenolol 100mg daily, amlodipine 10mg daily, losartan 100 daily, doxazosin     HLD  - reported home medication atorvastatin 40mg daily     Obesity  - consider referral to pulm outpatient for sleep study  - consider referral to bariatrics for weight loss management     HCM  - recommend colonoscopy  - recommend immunizations, pt declined Flu/COVID/Pneumovax/RSV     Diet: diabetic  Code: .full  DVT ppx: lovenox  Dispo: home when stable  Estimated LOS: 24-48 hourse    Giovanny San MD  [facility] HO-III    [facility] Hospitalist Pager numbers:   [facility] Hospitalist Medicine Team A (Dominic/Romana): [phone]  [facility] Hospitalist Medicine Team B  (Catracho/Raymond):  [phone]

## 2024-12-08 NOTE — PROGRESS NOTES
"Pharmacokinetic Initial Assessment: IV Vancomycin    Assessment/Plan:    Initiate intravenous vancomycin with loading dose of 2250 mg once followed by a maintenance dose of vancomycin 1750mg IV every 24 hours  Desired empiric serum trough concentration is 10 to 15 mcg/mL  Draw vancomycin trough level 60 min prior to third dose on 12/9 at approximately 1800  Pharmacy will continue to follow and monitor vancomycin.      Please contact pharmacy at extension [phone] with any questions regarding this assessment.     Thank you for the consult,   Heber Stokes       Patient brief summary:  Christine Delgadillo is a 74 y.o. female initiated on antimicrobial therapy with IV Vancomycin for treatment of suspected skin & soft tissue infection    Drug Allergies:   Review of patient's allergies indicates:   Allergen Reactions    Codeine        Actual Body Weight:   104.3kg    Renal Function:   Estimated Creatinine Clearance: 49 mL/min (based on SCr of 1.2 mg/dL).,     Dialysis Method (if applicable):  N/A    CBC (last 72 hours):  Recent Labs   Lab Result Units 12/07/24  1005 12/07/24  1513   WBC K/uL 14.06*  --    Hemoglobin g/dL 9.9*  --    Hemoglobin A1C %  --  5.3   Hematocrit % 31.1*  --    Platelets K/uL 247  --    Gran % % 84.2*  --    Lymph % % 5.5*  --    Mono % % 6.6  --    Eosinophil % % 2.8  --    Basophil % % 0.4  --    Differential Method  Automated  --        Metabolic Panel (last 72 hours):  Recent Labs   Lab Result Units 12/07/24  1005 12/07/24  1425   Sodium mmol/L 144  --    Potassium mmol/L 5.5*  --    Chloride mmol/L 114*  --    CO2 mmol/L 20*  --    Glucose mg/dL 93  --    Glucose, UA   --  Negative   BUN mg/dL 17  --    Creatinine mg/dL 1.2  --    Albumin g/dL 3.4*  --    Total Bilirubin mg/dL 1.2*  --    Alkaline Phosphatase U/L 74  --    AST U/L 31  --    ALT U/L 19  --        Drug levels (last 3 results):  No results for input(s): "VANCOMYCINRA", "VANCORANDOM", "VANCOMYCINPE", "VANCOPEAK", "VANCOMYCINTR", " ""[facility]" in the last 72 hours.    Microbiologic Results:  Microbiology Results (last 7 days)       Procedure Component Value Units Date/Time    Influenza A & B by Molecular [[id number]] Collected: 12/07/24 0959    Order Status: Completed Specimen: Nasopharyngeal Swab Updated: 12/07/24 1049     Influenza A, Molecular Negative     Influenza B, Molecular Negative     Flu A & B Source Nasal swab            "

## 2024-12-08 NOTE — NURSING
Pt transferred from ED. AAOx4. PW in place. Belongings w/in reach. Oxygen therapy continued. Vanc infusing from ED. Redness to L eye and green drainage noted. Safety m/t.

## 2024-12-08 NOTE — CARE UPDATE
Inpatient Upgrade Note    Christine Delgadillo has warranted treatment spanning two or more midnights of hospital level care for the management of heart failure. She continues to require IV diuresis. Her condition is also complicated by the following comorbidities: Hypertension, Diabetes, and Obesity.    Giovanny San MD  [facility] Internal Medicine,  - [facility]

## 2024-12-08 NOTE — PLAN OF CARE
A&O x4. Pt denies N/V, SOB, distress, and pain. Medications given per MAR. Vital signs stable. Weight shifting assistance provided. Voiding via purewick. Safety precautions maintained. Bed alarm set. Call bell within reach. Patient encouraged to call for assistance.      Problem: Adult Inpatient Plan of Care  Goal: Patient-Specific Goal (Individualized)  Outcome: Progressing  Goal: Optimal Comfort and Wellbeing  Outcome: Progressing     Problem: Skin or Soft Tissue Infection  Goal: Absence of Infection Signs and Symptoms  Outcome: Progressing

## 2024-12-08 NOTE — PLAN OF CARE
12/07/24 2014   Admission   Initial VN Admission Questions Complete   Communication Issues? None   Shift   Pain Management Interventions quiet environment facilitated;relaxation techniques promoted   Virtual Nurse - Patient Verbalized Approval Of VN Rounding;Camera Use   Type of Frequent Check   Type Patient Rounds   Safety/Activity   Patient Rounds bed in low position;bed wheels locked;call light in patient/parent reach;clutter free environment maintained;ID band on;visualized patient;placement of personal items at bedside   Safety Promotion/Fall Prevention assistive device/personal item within reach;bed alarm set;room near unit station;patient expresses understanding of fall risk and prevention;/camera at bedside;side rails raised x 2;Fall Risk reviewed with patient/family;nonskid shoes/socks when out of bed   Safety Precautions emergency equipment at bedside   Safety Bands on Patient Fall Risk Band;Allergy Band   Activity Management Ambulated -L4   Elimination Assistance other (see comments)  (Purewick in place)   Positioning   Body Position position changed independently   Head of Bed (HOB) Positioning HOB elevated   Positioning/Transfer Devices pillows;in use        VIRTUAL NURSE: Pt arrived to unit. Permission received per patient to turn camera to view patient. VIP model explained; patient informed this VN will be working with bedside nurse and the rest of the care team. Plan of care reviewed with patient.  Educated patient on VTE, fall risk and fall risk precautions in place. Call light within reach, side rails up x2. Admission questions completed. Patient instucted to ask staff for assistance. Patient verbalized complete understanding. Patient denies complaints or any needs at this time. Will continue to be available and intervene as needed.

## 2024-12-09 LAB
ALBUMIN SERPL BCP-MCNC: 2.9 G/DL (ref 3.5–5.2)
ALP SERPL-CCNC: 61 U/L (ref 40–150)
ALT SERPL W/O P-5'-P-CCNC: 22 U/L (ref 10–44)
ANION GAP SERPL CALC-SCNC: 9 MMOL/L (ref 8–16)
ASCENDING AORTA: 2.48 CM
AST SERPL-CCNC: 29 U/L (ref 10–40)
AV INDEX (PROSTH): 0.65
AV MEAN GRADIENT: 6.5 MMHG
AV PEAK GRADIENT: 10.2 MMHG
AV VALVE AREA BY VELOCITY RATIO: 1.4 CM²
AV VALVE AREA: 1.7 CM²
AV VELOCITY RATIO: 0.56
BASOPHILS # BLD AUTO: 0.06 K/UL (ref 0–0.2)
BASOPHILS NFR BLD: 0.5 % (ref 0–1.9)
BILIRUB SERPL-MCNC: 1.1 MG/DL (ref 0.1–1)
BSA FOR ECHO PROCEDURE: 2.15 M2
BUN SERPL-MCNC: 27 MG/DL (ref 8–23)
CALCIUM SERPL-MCNC: 8.7 MG/DL (ref 8.7–10.5)
CHLORIDE SERPL-SCNC: 109 MMOL/L (ref 95–110)
CO2 SERPL-SCNC: 22 MMOL/L (ref 23–29)
CREAT SERPL-MCNC: 1.3 MG/DL (ref 0.5–1.4)
CV ECHO LV RWT: 0.36 CM
DIFFERENTIAL METHOD BLD: ABNORMAL
DOP CALC AO PEAK VEL: 1.6 M/S
DOP CALC AO VTI: 40.9 CM
DOP CALC LVOT AREA: 2.5 CM2
DOP CALC LVOT DIAMETER: 1.8 CM
DOP CALC LVOT PEAK VEL: 0.9 M/S
DOP CALC LVOT STROKE VOLUME: 67.9 CM3
DOP CALC MV VTI: 40.5 CM
DOP CALCLVOT PEAK VEL VTI: 26.7 CM
E WAVE DECELERATION TIME: 170.34 MSEC
E/A RATIO: 1.47
E/E' RATIO: 16.67 M/S
ECHO LV POSTERIOR WALL: 0.7 CM (ref 0.6–1.1)
EOSINOPHIL # BLD AUTO: 0.4 K/UL (ref 0–0.5)
EOSINOPHIL NFR BLD: 3.4 % (ref 0–8)
ERYTHROCYTE [DISTWIDTH] IN BLOOD BY AUTOMATED COUNT: 14.8 % (ref 11.5–14.5)
EST. GFR  (NO RACE VARIABLE): 44 ML/MIN/1.73 M^2
FRACTIONAL SHORTENING: 33.3 % (ref 28–44)
GLUCOSE SERPL-MCNC: 148 MG/DL (ref 70–110)
HCT VFR BLD AUTO: 33.8 % (ref 37–48.5)
HGB BLD-MCNC: 9.7 G/DL (ref 12–16)
IMM GRANULOCYTES # BLD AUTO: 0.11 K/UL (ref 0–0.04)
IMM GRANULOCYTES NFR BLD AUTO: 0.9 % (ref 0–0.5)
INTERVENTRICULAR SEPTUM: 0.7 CM (ref 0.6–1.1)
IVC DIAMETER: 1.1 CM
IVRT: 65.65 MSEC
LEFT ATRIUM AREA SYSTOLIC (APICAL 2 CHAMBER): 20.98 CM2
LEFT ATRIUM AREA SYSTOLIC (APICAL 4 CHAMBER): 24.91 CM2
LEFT ATRIUM VOLUME INDEX MOD: 38.5 ML/M2
LEFT ATRIUM VOLUME MOD: 78.87 ML
LEFT INTERNAL DIMENSION IN SYSTOLE: 2.6 CM (ref 2.1–4)
LEFT VENTRICLE DIASTOLIC VOLUME INDEX: 32.33 ML/M2
LEFT VENTRICLE DIASTOLIC VOLUME: 66.27 ML
LEFT VENTRICLE END SYSTOLIC VOLUME APICAL 2 CHAMBER: 69.17 ML
LEFT VENTRICLE END SYSTOLIC VOLUME APICAL 4 CHAMBER: 83.87 ML
LEFT VENTRICLE MASS INDEX: 36.6 G/M2
LEFT VENTRICLE SYSTOLIC VOLUME INDEX: 12.2 ML/M2
LEFT VENTRICLE SYSTOLIC VOLUME: 25.07 ML
LEFT VENTRICULAR INTERNAL DIMENSION IN DIASTOLE: 3.9 CM (ref 3.5–6)
LEFT VENTRICULAR MASS: 75.1 G
LV LATERAL E/E' RATIO: 15.63 M/S
LV SEPTAL E/E' RATIO: 17.86 M/S
LVED V (TEICH): 66.27 ML
LVES V (TEICH): 25.07 ML
LVOT MG: 2.48 MMHG
LVOT MV: 0.77 CM/S
LYMPHOCYTES # BLD AUTO: 1.1 K/UL (ref 1–4.8)
LYMPHOCYTES NFR BLD: 9 % (ref 18–48)
MCH RBC QN AUTO: 28.5 PG (ref 27–31)
MCHC RBC AUTO-ENTMCNC: 28.7 G/DL (ref 32–36)
MCV RBC AUTO: 99 FL (ref 82–98)
MONOCYTES # BLD AUTO: 1.2 K/UL (ref 0.3–1)
MONOCYTES NFR BLD: 9.9 % (ref 4–15)
MR PISA EROA: 0.98 CM2
MV MEAN GRADIENT: 3 MMHG
MV PEAK A VEL: 0.85 M/S
MV PEAK E VEL: 1.25 M/S
MV PEAK GRADIENT: 11 MMHG
MV STENOSIS PRESSURE HALF TIME: 49.4 MS
MV VALVE AREA BY CONTINUITY EQUATION: 1.68 CM2
MV VALVE AREA P 1/2 METHOD: 4.45 CM2
NEUTROPHILS # BLD AUTO: 9.5 K/UL (ref 1.8–7.7)
NEUTROPHILS NFR BLD: 76.3 % (ref 38–73)
NRBC BLD-RTO: 0 /100 WBC
OHS CV RV/LV RATIO: 0.85 CM
PISA MRMAX VEL: 5.39 M/S
PISA RADIUS: 1.65 CM
PISA TR MAX VEL: 3.95 M/S
PISA VN NYQUIST MS: 0.31 M/S
PISA VN NYQUIST: 0.31 M/S
PLATELET # BLD AUTO: 196 K/UL (ref 150–450)
PMV BLD AUTO: 10.9 FL (ref 9.2–12.9)
POCT GLUCOSE: 145 MG/DL (ref 70–110)
POCT GLUCOSE: 191 MG/DL (ref 70–110)
POCT GLUCOSE: 217 MG/DL (ref 70–110)
POTASSIUM SERPL-SCNC: 4.3 MMOL/L (ref 3.5–5.1)
PROT SERPL-MCNC: 6.5 G/DL (ref 6–8.4)
PULM VEIN S/D RATIO: 1.27
PV MV: 0.97 M/S
PV PEAK D VEL: 0.22 M/S
PV PEAK GRADIENT: 6 MMHG
PV PEAK S VEL: 0.28 M/S
PV PEAK VELOCITY: 1.21 M/S
RA PRESSURE ESTIMATED: 3 MMHG
RA VOL SYS: 55.89 ML
RBC # BLD AUTO: 3.4 M/UL (ref 4–5.4)
RIGHT ATRIAL AREA: 17.7 CM2
RIGHT ATRIUM VOLUME AREA LENGTH APICAL 4 CHAMBER: 52.27 ML
RIGHT VENTRICLE DIASTOLIC BASEL DIMENSION: 3.3 CM
RV TB RVSP: 7 MMHG
RV TISSUE DOPPLER FREE WALL SYSTOLIC VELOCITY 1 (APICAL 4 CHAMBER VIEW): 10.15 CM/S
SINUS: 2.63 CM
SODIUM SERPL-SCNC: 140 MMOL/L (ref 136–145)
STJ: 2.5 CM
TDI LATERAL: 0.08 M/S
TDI SEPTAL: 0.07 M/S
TDI: 0.08 M/S
TR MAX PG: 62 MMHG
TRICUSPID ANNULAR PLANE SYSTOLIC EXCURSION: 2.21 CM
TV REST PULMONARY ARTERY PRESSURE: 65 MMHG
VANCOMYCIN TROUGH SERPL-MCNC: 22.4 UG/ML (ref 10–22)
WBC # BLD AUTO: 12.46 K/UL (ref 3.9–12.7)
Z-SCORE OF LEFT VENTRICULAR DIMENSION IN END DIASTOLE: -4.55
Z-SCORE OF LEFT VENTRICULAR DIMENSION IN END SYSTOLE: -2.93

## 2024-12-09 PROCEDURE — 97535 SELF CARE MNGMENT TRAINING: CPT

## 2024-12-09 PROCEDURE — 97161 PT EVAL LOW COMPLEX 20 MIN: CPT

## 2024-12-09 PROCEDURE — 11000001 HC ACUTE MED/SURG PRIVATE ROOM

## 2024-12-09 PROCEDURE — 80202 ASSAY OF VANCOMYCIN: CPT | Performed by: INTERNAL MEDICINE

## 2024-12-09 PROCEDURE — 63600175 PHARM REV CODE 636 W HCPCS

## 2024-12-09 PROCEDURE — 63600175 PHARM REV CODE 636 W HCPCS: Performed by: INTERNAL MEDICINE

## 2024-12-09 PROCEDURE — 97165 OT EVAL LOW COMPLEX 30 MIN: CPT

## 2024-12-09 PROCEDURE — 94761 N-INVAS EAR/PLS OXIMETRY MLT: CPT

## 2024-12-09 PROCEDURE — 99900035 HC TECH TIME PER 15 MIN (STAT)

## 2024-12-09 PROCEDURE — 97530 THERAPEUTIC ACTIVITIES: CPT

## 2024-12-09 PROCEDURE — 94640 AIRWAY INHALATION TREATMENT: CPT

## 2024-12-09 PROCEDURE — 80053 COMPREHEN METABOLIC PANEL: CPT | Performed by: INTERNAL MEDICINE

## 2024-12-09 PROCEDURE — 85025 COMPLETE CBC W/AUTO DIFF WBC: CPT

## 2024-12-09 PROCEDURE — 25000003 PHARM REV CODE 250

## 2024-12-09 PROCEDURE — 27000221 HC OXYGEN, UP TO 24 HOURS

## 2024-12-09 PROCEDURE — 36415 COLL VENOUS BLD VENIPUNCTURE: CPT

## 2024-12-09 PROCEDURE — 97116 GAIT TRAINING THERAPY: CPT

## 2024-12-09 PROCEDURE — 25000242 PHARM REV CODE 250 ALT 637 W/ HCPCS

## 2024-12-09 PROCEDURE — 36415 COLL VENOUS BLD VENIPUNCTURE: CPT | Mod: XB | Performed by: INTERNAL MEDICINE

## 2024-12-09 RX ORDER — IPRATROPIUM BROMIDE AND ALBUTEROL SULFATE 2.5; .5 MG/3ML; MG/3ML
3 SOLUTION RESPIRATORY (INHALATION)
Status: DISCONTINUED | OUTPATIENT
Start: 2024-12-09 | End: 2024-12-11 | Stop reason: HOSPADM

## 2024-12-09 RX ORDER — PREDNISONE 20 MG/1
40 TABLET ORAL DAILY
Status: DISCONTINUED | OUTPATIENT
Start: 2024-12-09 | End: 2024-12-11 | Stop reason: HOSPADM

## 2024-12-09 RX ORDER — ENOXAPARIN SODIUM 100 MG/ML
40 INJECTION SUBCUTANEOUS EVERY 24 HOURS
Status: DISCONTINUED | OUTPATIENT
Start: 2024-12-09 | End: 2024-12-11 | Stop reason: HOSPADM

## 2024-12-09 RX ORDER — FUROSEMIDE 10 MG/ML
40 INJECTION INTRAMUSCULAR; INTRAVENOUS ONCE
Status: COMPLETED | OUTPATIENT
Start: 2024-12-09 | End: 2024-12-09

## 2024-12-09 RX ADMIN — SERTRALINE HYDROCHLORIDE 50 MG: 50 TABLET ORAL at 09:12

## 2024-12-09 RX ADMIN — FUROSEMIDE 40 MG: 10 INJECTION, SOLUTION INTRAMUSCULAR; INTRAVENOUS at 09:12

## 2024-12-09 RX ADMIN — INSULIN GLARGINE 7 UNITS: 100 INJECTION, SOLUTION SUBCUTANEOUS at 08:12

## 2024-12-09 RX ADMIN — ERYTHROMYCIN: 5 OINTMENT OPHTHALMIC at 12:12

## 2024-12-09 RX ADMIN — ERYTHROMYCIN: 5 OINTMENT OPHTHALMIC at 05:12

## 2024-12-09 RX ADMIN — VANCOMYCIN HYDROCHLORIDE 1750 MG: 500 INJECTION, POWDER, LYOPHILIZED, FOR SOLUTION INTRAVENOUS at 06:12

## 2024-12-09 RX ADMIN — AMLODIPINE BESYLATE 10 MG: 5 TABLET ORAL at 09:12

## 2024-12-09 RX ADMIN — ATORVASTATIN CALCIUM 40 MG: 40 TABLET, FILM COATED ORAL at 08:12

## 2024-12-09 RX ADMIN — IPRATROPIUM BROMIDE AND ALBUTEROL SULFATE 3 ML: .5; 3 SOLUTION RESPIRATORY (INHALATION) at 01:12

## 2024-12-09 RX ADMIN — LOSARTAN POTASSIUM 100 MG: 50 TABLET, FILM COATED ORAL at 09:12

## 2024-12-09 RX ADMIN — IPRATROPIUM BROMIDE AND ALBUTEROL SULFATE 3 ML: .5; 3 SOLUTION RESPIRATORY (INHALATION) at 07:12

## 2024-12-09 RX ADMIN — PREDNISONE 40 MG: 20 TABLET ORAL at 10:12

## 2024-12-09 RX ADMIN — CLOPIDOGREL BISULFATE 75 MG: 75 TABLET ORAL at 09:12

## 2024-12-09 RX ADMIN — ERYTHROMYCIN: 5 OINTMENT OPHTHALMIC at 11:12

## 2024-12-09 RX ADMIN — ATENOLOL 100 MG: 25 TABLET ORAL at 09:12

## 2024-12-09 RX ADMIN — ENOXAPARIN SODIUM 40 MG: 40 INJECTION SUBCUTANEOUS at 05:12

## 2024-12-09 RX ADMIN — DOXAZOSIN 8 MG: 2 TABLET ORAL at 08:12

## 2024-12-09 NOTE — PT/OT/SLP EVAL
Occupational Therapy   Evaluation    Name: Christine Delgadillo  MRN: [id number]  Admitting Diagnosis: Acute hypoxic respiratory failure  Recent Surgery: * No surgery found *      Recommendations:     Discharge Recommendations: Low Intensity Therapy  Discharge Equipment Recommendations:  none  Barriers to discharge:  Inaccessible home environment    Assessment:     Christine Delgadillo is a 74 y.o. female with a medical diagnosis of Acute hypoxic respiratory failure.  She presents with the following performance deficits affecting function: weakness, impaired endurance, impaired functional mobility, gait instability, decreased lower extremity function, decreased upper extremity function, decreased safety awareness, decreased ROM, impaired coordination, impaired cardiopulmonary response to activity.      Pt was agreeable to and participated in OT/PT evaluation.  Pt reports that she was mod I with mobility and ADLs at PLOF, except for walking up/down steps to bedroom/bath on 2nd floor (assisted by son).  Pt completed her evaluation and noted to require setup - SBA with ADLS and SBA - min A with func mobility. Goals established to assist pt with returning to PLOF regarding ADLs and func mobility.  Pt will benefit from skilled OT services in order to increase her level of safety and independence with ADLs and mobility.        Rehab Prognosis: Good; patient would benefit from acute skilled OT services to address these deficits and reach maximum level of function.       Plan:     Patient to be seen 3 x/week to address the above listed problems via therapeutic activities, therapeutic exercises  Plan of Care Expires: 01/08/25  Plan of Care Reviewed with: patient    Subjective     Chief Complaint: SOB when working on step  Patient/Family Comments/goals: return to PLOF    Occupational Profile:  Living Environment: pt lives with her adult son and adult granddaughter in a 2 SH - THE to enter front door - bed/bath on 2nd floor with HR on L -  T/S combo for bathing  Previous level of function: mod I with mobility and ADLS - son assists her up/down steps  Equipment Used at Home: rollator, shower chair  Assistance upon Discharge: son, but he works    Pain/Comfort:  Pain Rating 1: 0/10  Pain Rating Post-Intervention 1: 0/10    Patients cultural, spiritual, Confucianism conflicts given the current situation: no    Objective:     Communicated with: nurse prior to session.  Patient found HOB elevated with PureWick, oxygen, bed alarm upon OT entry to room.    General Precautions: Standard, fall  Orthopedic Precautions: N/A  Braces: N/A  Respiratory Status: Nasal cannula, flow 2 L/min    Occupational Performance:    Bed Mobility:    Patient completed Scooting/Bridging with stand by assistance  Patient completed Supine to Sit with stand by assistance    Functional Mobility/Transfers:  Patient completed Sit <> Stand Transfer with stand by assistance  with  rollator   Patient completed Bed <> Chair Transfer using Step Transfer technique with SBA-CGA with rollator  Functional Mobility: pt walked from bed -> sink -> bedside chair (across room) using her personal rollator with SBA    Activities of Daily Living:  Feeding:  independence    Grooming: stand by assistance standing at sink (leaned onto sink when washing face and brushing teeth)  Lower Body Dressing: set up A to garrison socks    Cognitive/Visual Perceptual:  Cognitive/Psychosocial Skills:     -       Oriented to: Person, Place, Time, and Situation   -       Follows Commands/attention:Follows two-step commands  -       Communication: clear/fluent  -       Memory: No Deficits noted  -       Safety awareness/insight to disability: impaired   -       Mood/Affect/Coping skills/emotional control: Appropriate to situation    Physical Exam:  Balance: -       sit = good;  stand = SBA with RW  Postural examination/scapula alignment:    -       Rounded shoulders  -       Forward head  Skin integrity: Visible skin  intact  Edema:  Mild LEs   Sensation: -       Intact  Upper Extremity Range of Motion:   R shoulder with limited flexion (~80*) but still able to perform all ADLS - no other deficits noted  Upper Extremity Strength:  BUEs = WFL for ADLS   Strength:  BUEs = WFL for ADLS    AMPAC 6 Click ADL:  AMPAC Total Score: 19    Treatment & Education:  Pt completed ADLs and func mobility activities for tx session as noted above  Pt educated on role of OT and POC      Patient left up in chair with all lines intact, call button in reach, chair alarm on, and nurse notified    GOALS:   Multidisciplinary Problems       Occupational Therapy Goals          Problem: Occupational Therapy    Goal Priority Disciplines Outcome Interventions   Occupational Therapy Goal     OT, PT/OT Progressing    Description: Goals to be met by: 01/08/25     Patient will increase functional independence with ADLs by performing:    UE Dressing with Modified [place].  LE Dressing with Modified [place].  Grooming while standing at sink with Modified [place].  Toileting from toilet with Modified [place] for hygiene and clothing management.   Toilet transfer to toilet with Modified [place].                         History:     Past Medical History:   Diagnosis Date    Essential (primary) hypertension        History reviewed. No pertinent surgical history.    Time Tracking:     OT Date of Treatment: 12/09/24  OT Start Time: 0929  OT Stop Time: 0954  OT Total Time (min): 25 min - coeval with PT    Billable Minutes:Evaluation 15  Self Care/Home Management 10    12/9/2024

## 2024-12-09 NOTE — PROGRESS NOTES
Pharmacist Renal Dose Adjustment Note    Christine Delgadillo is a 74 y.o. female being treated with the medication enoxaparin    Patient Data:    Vital Signs (Most Recent):  Temp: 98.5 °F (36.9 °C) (12/09/24 0744)  Pulse: (!) 58 (12/09/24 1332)  Resp: 16 (12/09/24 1332)  BP: (!) 169/74 (12/09/24 0928)  SpO2: 98 % (12/09/24 1332) Vital Signs (72h Range):  Temp:  [98.1 °F (36.7 °C)-100 °F (37.8 °C)]   Pulse:  [58-78]   Resp:  [16-29]   BP: (144-189)/()   SpO2:  [84 %-98 %]      Recent Labs   Lab 12/07/24 2024 12/08/24  0842 12/09/24  0854   CREATININE 1.2 1.2 1.3     Serum creatinine: 1.3 mg/dL 12/09/24 0854  Estimated creatinine clearance: 45.2 mL/min    Medication:enoxaparin dose: 30mg frequency q24h will be changed to medication:enoxaparin dose:40mg frequency:q24h    Pharmacist's Name: Rocco Schofield  Pharmacist's Extension: [phone]

## 2024-12-09 NOTE — PLAN OF CARE
Problem: Occupational Therapy  Goal: Occupational Therapy Goal  Description: Goals to be met by: 01/08/25     Patient will increase functional independence with ADLs by performing:    UE Dressing with Modified [place].  LE Dressing with Modified [place].  Grooming while standing at sink with Modified [place].  Toileting from toilet with Modified [place] for hygiene and clothing management.   Toilet transfer to toilet with Modified [place].    Outcome: Progressing     Pt was agreeable to and participated in OT/PT evaluation.  Pt reports that she was mod I with mobility and ADLs at [facility], except for walking up/down steps to bedroom/bath on 2nd floor (assisted by son).  Pt completed her evaluation and noted to require setup - SBA with ADLS and SBA - min A with func mobility. Goals established to assist pt with returning to [facility] regarding ADLs and func mobility.  Pt will benefit from skilled OT services in order to increase her level of safety and independence with ADLs and mobility.      Radha Davis, OT  12/9/2024

## 2024-12-09 NOTE — PLAN OF CARE
Problem: Infection  Goal: Absence of Infection Signs and Symptoms  Outcome: Progressing     Problem: Adult Inpatient Plan of Care  Goal: Plan of Care Review  Outcome: Progressing  Goal: Patient-Specific Goal (Individualized)  Outcome: Progressing     Problem: Bariatric Environmental Safety  Goal: Safety Maintained with Care  Outcome: Progressing     Problem: Comorbidity Management  Goal: Blood Pressure in Desired Range  Outcome: Progressing     Problem: Skin or Soft Tissue Infection  Goal: Absence of Infection Signs and Symptoms  Outcome: Progressing     Problem: ARDS (Acute Respiratory Distress Syndrome)  Goal: Effective Oxygenation  Outcome: Progressing    Educated patient about oxygen titration and possible weaning off oxygen.

## 2024-12-09 NOTE — PROGRESS NOTES
"[facility] Medicine Progress Note    Primary Team: [facility] Hospitalist Team A  Attending Physician: Azeb Avendano MD  Resident: Mena  Intern: Kwadwo    Subjective:      NAEON. This morning patient sitting up in bed, comfortable. States breathing has improved with diuresis. Only 2.7Ls measured overnight. Denies any pain around eye or change in vision     Objective:     Last 24 Hour Vital Signs:  BP  Min: 144/73  Max: 189/77  Temp  Av.6 °F (37 °C)  Min: 98.2 °F (36.8 °C)  Max: 99.1 °F (37.3 °C)  Pulse  Av.6  Min: 65  Max: 76  Resp  Av  Min: 18  Max: 20  SpO2  Av %  Min: 84 %  Max: 98 %  I/O last 3 completed shifts:  In: 913.9 [P.O.:476; IV Piggyback:437.9]  Out: 3450 [Urine:3450]    Physical Examination:    BP (!) 146/61   Pulse 65   Temp 98.2 °F (36.8 °C) (Oral)   Resp 18   Ht 5' 3" (1.6 m)   Wt 104.3 kg (230 lb)   SpO2 (!) 92%   BMI 40.74 kg/m²     General Appearance:    Alert, cooperative, no distress, appears stated age   Head:    Bruising around left orbit with skin breakdown. Small green discharge at corners of the eye.    Eyes:    PERRL, conjunctival hemorrhage present in Left , EOM's intact, fundi     benign, both eyes   Ears:    Normal TM's and external ear canals, both ears   Nose:   Nares normal, septum midline, mucosa normal, no drainage    or sinus tenderness   Throat:   Lips, mucosa, and tongue normal; missing teeth, gold caps on incisors    Neck:   Supple, symmetrical, trachea midline, no adenopathy;     thyroid:  no enlargement/tenderness/nodules; no carotid    bruit or JVD   Back:     Symmetric, no curvature, ROM normal, no CVA tenderness   Lungs:     End expiratory wheeze present, rhonchi present b/l   Chest Wall:    No tenderness or deformity    Heart:    Regular rate and rhythm, S1 and S2 normal, no murmur, rub   or gallop       Abdomen:     Soft, non-tender, bowel sounds active all four quadrants,     no masses, no organomegaly           Extremities:   Extremities " normal, atraumatic, no cyanosis or edema   Pulses:   2+ and symmetric all extremities   Skin:   Skin color, texture, turgor normal, no rashes or lesions   Lymph nodes:   Cervical, supraclavicular, and axillary nodes normal   Neurologic:   CNII-XII intact, normal strength, sensation and reflexes     throughout         Other Results:  EKG (my interpretation): .sinus rate and rhythm, Normal intervals, normal axis, no acute ST or TWA    Current Medications:     Infusions:       Scheduled:   amLODIPine  10 mg Oral Daily    atenoloL  100 mg Oral Daily    atorvastatin  40 mg Oral QHS    clopidogreL  75 mg Oral Daily    doxazosin  8 mg Oral QHS    enoxparin  30 mg Subcutaneous Daily    erythromycin   Left Eye Q6H    insulin glargine U-100  7 Units Subcutaneous QHS    losartan  100 mg Oral Daily    sertraline  50 mg Oral Daily    vancomycin (VANCOCIN) IV (PEDS and ADULTS)  1,750 mg Intravenous Q24H        PRN:    Current Facility-Administered Medications:     dextrose 10%, 12.5 g, Intravenous, PRN    dextrose 10%, 25 g, Intravenous, PRN    glucagon (human recombinant), 1 mg, Intramuscular, PRN    glucose, 16 g, Oral, PRN    glucose, 24 g, Oral, PRN    Pharmacy to dose Vancomycin consult, , , Once **AND** vancomycin - pharmacy to dose, , Intravenous, pharmacy to manage frequency    Antibiotics and Day Number of Therapy:  None      Assessment:      Christine Delgadillo is a 74 y.o. female with PMHx of HTN, falls and morbid obesity who presented to [facility] on [facility] for SOB x 1 week, worsened in last 24hr.  Patient is admitted to [facility] for respiratory workup for SOB.     Plan:     Acute Hypoxic Respiratory Failure  SOB  Wheezing   Concern for new HFpEF vs Upper Respiratory Infection  Pt states she is intermittently SOB at baseline to the degree of being limited to mobility bound within the home. Additionally with chronic, nonproductive cough intermittently at baseline. Uses a walker. Pt endorses worsened SOB for a week and  progressively worsen x 1 day. Likely given remote hx of tobacco use, large body habitus pt with underlying restrictive lung disease and  possibly complicated by an acute upper viral infection. However this pt did not have resolution of symptoms with therapy in ED so further evaluation warranted broadening differential to a cardiac cause. POCUS completed at bedside with limited view given body habitus, but did not see obvious fluid or concerningly low EF.   - flu, COVID neg  -    - CXR WNL  - TTE ordered  - supplemental oxygen as needed and wean as tolerated; if remains hypoxic or unable to wean oxygen, complete 6 min walk test prior to discharge  - continuing diuresis with lasix 40 IV  - Trial of Prednisone and duonebs today     Electrolyte derangements  Hyperkalemia  - CTM BMP     Recurrent Fall  Pt described drop attacks without any prodromal symptoms. Additionally denied any tripping or mechanical causes for falls. With multiple falls as recent of last week.  - EKG wnl  - ordered TTE     Left Periorbital contusion, POA  Preseptal orbital cellulitis  With periorbital edema, sclera is blood shot, with periorbital bruising noted and yellowish green purulent drainage from skin opening. No proptosis, ophthalmoplegia or vision impairment  - ordered vancomycin, ordered erythromycin ointment  - continue previous recommendation of following up with ophthalmology outpatient     HTN  - reported home medication atenolol 100mg daily, amlodipine 10mg daily, losartan 100 daily, doxazosin     HLD  - reported home medication atorvastatin 40mg daily     Obesity  - consider referral to pulm outpatient for sleep study  - consider referral to bariatrics for weight loss management     HCM  - recommend colonoscopy  - recommend immunizations, pt declined Flu/COVID/Pneumovax/RSV     Diet: diabetic  Code: .full  DVT ppx: lovenox  Dispo: home when stable  Estimated LOS: 24-48 hourse    Giovanny San MD  [facility]  HO-III    [facility] Medicine Hospitalist Pager numbers:   [facility] Hospitalist Medicine Team A (Dominic/Romana): [phone]  [facility] Hospitalist Medicine Team B ([place]/Raymond):  [phone]

## 2024-12-09 NOTE — PLAN OF CARE
Problem: Physical Therapy  Goal: Physical Therapy Goal  Description: Goals to be met by: 25     Patient will increase functional independence with mobility by performin. Supine to sit with Modified [place]  2. Sit to supine with Modified [place]  3. Sit to stand transfer with Modified [place] with use of rollator.   4. Bed to chair transfer with Modified [place] using rollator.  5. Gait  x 150 feet with Modified [place] using rollator.  6. Ascend/descend 5 stair with bilateral Handrails Modified [place].    Outcome: Progressing    PT/OT co-evaluation due to anticipated complexity of pt's presentation. Pt's PLOF: MOD I with use of rollator. Pt at this time SBA with mobility with use of rollator. Therapy recommending low intensity therapy-  PT with family assistance. Therapy will continue to progress pt as able.

## 2024-12-09 NOTE — PLAN OF CARE
SW met with pt at bedside this AM to complete DCA. Per pt she was having 0/10 pain and was in a pleasant mood throughout assessment. Pt stated that she lives at home with her son Balaji [phone] and will have him help transport her home at time of d/c. Pt has a rollator and sc at home that she will use to complete her ADL's. Pt does not receive any HH or HD services. Pt and family did not have any additional questions or concerns for sw at this time. White board updated with CM name and contact information.  Discharge brochure provided.  Pt encouraged to call with any questions or concerns.  Cm will continue to follow pt through transitions of care and assist with any discharge needs.    Perez Harrington, MSAYESHA  [phone]    No future appointments.     12/09/24 1207   Discharge Assessment   Assessment Type Discharge Planning Assessment   Confirmed/corrected address, phone number and insurance Yes   Confirmed Demographics Correct on Facesheet   Source of Information patient   Communicated JESUS with patient/caregiver Yes   Reason For Admission Acute hypoxic respiratory failure   People in Home alone   Facility Arrived From: home   Do you expect to return to your current living situation? No   Do you have help at home or someone to help you manage your care at home? Yes   Who are your caregiver(s) and their phone number(s)? son Balaji [phone]   Prior to hospitilization cognitive status: Alert/Oriented   Current cognitive status: Alert/Oriented   Walking or Climbing Stairs Difficulty no   Dressing/Bathing Difficulty no   Do you have any problems with: Errands/Grocery   Home Accessibility wheelchair accessible   Home Layout Able to live on 1st floor   Equipment Currently Used at Home rollator;shower chair   Readmission within 30 days? No   Patient currently being followed by outpatient case management? No   Do you currently have service(s) that help you manage your care at home? No   Do you take prescription  medications? Yes   Do you have prescription coverage? Yes   Coverage [employer]   Do you have any problems affording any of your prescribed medications? No   Is the patient taking medications as prescribed? yes   Who is going to help you get home at discharge? ramona Carpio [phone]   How do you get to doctors appointments? car, drives self   Are you on dialysis? No   Do you take coumadin? No   Discharge Plan A Home with family   DME Needed Upon Discharge  none   Discharge Plan discussed with: Patient   Transition of Care Barriers None   Health Literacy   How often do you need to have someone help you when you read instructions, pamphlets, or other written material from your doctor or pharmacy? Often

## 2024-12-09 NOTE — MEDICAL/APP STUDENT
[facility] Medicine Progress Note    Primary Team: [facility] Hospitalist Team A  Attending Physician: Azeb Avendano MD  Resident: Giovanny  Intern: Re    Date of Admit: 2024     Chief Complaint: Acute respiratory failure     Subjective/Interval Events:      NAEON. /74 this morning. 94% this morning with 2L NC. Patient reports her SOB has improved. She denies any fever, chills, vision changes, eye pain, CP, ab pain.     Objective:   Last 24 Hour Vital Signs:  BP  Min: 144/73  Max: 189/77  Temp  Av.6 °F (37 °C)  Min: 98.2 °F (36.8 °C)  Max: 99.1 °F (37.3 °C)  Pulse  Av.6  Min: 65  Max: 76  Resp  Av  Min: 18  Max: 20  SpO2  Av.3 %  Min: 84 %  Max: 98 %    Intake/Output Summary (Last 24 hours) at 2024 0723  Last data filed at 2024 0632  Gross per 24 hour   Intake 476 ml   Output 2850 ml   Net -2374 ml       Physical Examination:  Physical Exam  Constitutional:       Appearance: She is obese.   HENT:      Head: Normocephalic.   Eyes:      General:         Left eye: Discharge (Yellow/green discharge) present.     Pupils: Pupils are equal, round, and reactive to light.      Comments: L sclera bloodshot/bright red   Cardiovascular:      Rate and Rhythm: Normal rate.      Pulses: Normal pulses.      Heart sounds: Normal heart sounds.   Pulmonary:      Breath sounds: Wheezing present.   Abdominal:      General: Abdomen is flat.      Palpations: Abdomen is soft.      Tenderness: There is no abdominal tenderness.   Musculoskeletal:      Cervical back: Normal range of motion.      Right lower leg: No edema.      Left lower leg: No edema.   Skin:     General: Skin is warm.      Findings: Bruising (L eye; purulent discharge; periorbital hematoma) present.   Neurological:      General: No focal deficit present.      Mental Status: She is alert and oriented to person, place, and time.      Cranial Nerves: No cranial nerve deficit.   Psychiatric:         Mood and Affect: Mood normal.           Laboratory:  Recent Labs   Lab 12/07/24  1005 12/07/24 2024 12/08/24  0842   WBC 14.06*  --  15.11*   HGB 9.9*  --  9.6*   HCT 31.1*  --  30.3*     --  242   MCV 91  --  90   RDW 14.8*  --  14.7*    142 141   K 5.5* 4.9 4.5   * 111* 111*   CO2 20* 19* 20*   BUN 17 19 22   CREATININE 1.2 1.2 1.2   GLU 93 161* 167*   PROT 7.8  --  7.1   ALBUMIN 3.4*  --  3.2*   BILITOT 1.2*  --  1.4*   AST 31  --  29   ALKPHOS 74  --  69   ALT 19  --  20       Microbiology:  Influenza A and B negative  COVID negative    Imaging:  CXR: no acute changes. Lung is upper normal in size. Symmetric. No pleural fluid or pneumothorax    Current Medications:     Scheduled:   amLODIPine  10 mg Oral Daily    atenoloL  100 mg Oral Daily    atorvastatin  40 mg Oral QHS    clopidogreL  75 mg Oral Daily    doxazosin  8 mg Oral QHS    enoxparin  30 mg Subcutaneous Daily    erythromycin   Left Eye Q6H    insulin glargine U-100  7 Units Subcutaneous QHS    losartan  100 mg Oral Daily    sertraline  50 mg Oral Daily    vancomycin (VANCOCIN) IV (PEDS and ADULTS)  1,750 mg Intravenous Q24H         Infusions:       PRN:    Current Facility-Administered Medications:     dextrose 10%, 12.5 g, Intravenous, PRN    dextrose 10%, 25 g, Intravenous, PRN    glucagon (human recombinant), 1 mg, Intramuscular, PRN    glucose, 16 g, Oral, PRN    glucose, 24 g, Oral, PRN    Pharmacy to dose Vancomycin consult, , , Once **AND** vancomycin - pharmacy to dose, , Intravenous, pharmacy to manage frequency    Assessment:     Christine Delgadillo is a 74 y.o. female with a PMHx of HTN, T2DM, HLD, history of cigarette smoking presenting with SOB. Patient does not have a diagnosed history of COPD but has a history of cigarette smoking with 5 pack year. Patient also reports history of exertional dyspnea and shortness of breath that has been going on for about 2 weeks that became worse today.      Plan:     Acute hypoxic respiratory failure    Pt states she is  intermittently SOB at baseline to the degree of being limited to mobility bound within the home. Additionally with chronic, nonproductive cough intermittently at baseline. Uses a walker. Pt endorses worsened SOB for a week and progressively worsen x 1 day. Likely given remote hx of tobacco use, large body habitus pt with underlying restrictive lung disease and  possibly complicated by an acute upper viral infection. However this pt did not have resolution of symptoms with therapy in ED so further evaluation warranted broadening differential to a cardiac cause. POCUS completed at bedside with limited view given body habitus, but did not see obvious fluid or concerningly low EF.   - flu, COVID neg  -   - CXR no acute findings on admission  - supplemental O2 as tolerated   - continue diuresis with lasix 40iv  - 2.8L urine output past 24 hours    Recent Fall/Trauma  #periorbital contusion  #preseptal cellulitis/infection  - he was recently presented to the ED on 12/01 from a periorbital contusion from a fall from the bed during a sleep.  - L eye is edematous and purulent; Patient denies any vision changes or pain with eye movement  Plan:  - vancomycin dosed by pharmacy for possible preseptal cellulitis covering MRSA     Leukocytosis  - possible infection of the L eye  Plan:  - start antibiotics therapy  - monitor daily CBC     Hypertension  - initial blood pressure 162/72  Plan:  - Continue home meds:   - Losartan 100mg PO daily  - amlodipine 10mg PO daily   - continue to monitor BP     Anemia, Normocytic  - on admission, Hgb 9.9, Hct 31.1, MVC 91, iron 22, TIBC 246, ferretin 608, folate 8.7, B12 596  - low iron, high Ferritin, normocytic  Plan:  - iron supplementation    HLD:  Plan:  - lipid panel - normal  - continue home Lipitor 40mg nightly     T2DM  - patient takes 15 units of insulin nightly  Plan:  - monitor POCT  - sliding scale insulin      CKD3A  - patient's eGFR 48 and Cr 1.2 on admission (around her  baseline)  Plan:  Continue to monitor      Obesity:  - BMI 40.74  Plan:  Outpatient follow up on discharge    Diet: Diabetic diet   Code: Full  Dispo: Home when stable      Jaeyeon Kweon [facility] MS4       [facility] Medicine Hospitalist Pager numbers:   [facility] Hospitalist Medicine Team A (Dominic/Romana): [phone]  [facility] Hospitalist Medicine Team B (Catracho/Raymond):  [phone]

## 2024-12-09 NOTE — PT/OT/SLP EVAL
Physical Therapy Evaluation and Treatment    Patient Name:  Christine Delgadillo   MRN:  [id number]    Recommendations:     Discharge Recommendations: Low Intensity Therapy (HH PT)   Discharge Equipment Recommendations: none   Barriers to discharge: None    Assessment:     Christine Delgadillo is a 74 y.o. female admitted with a medical diagnosis of Acute hypoxic respiratory failure.  She presents with the following impairments/functional limitations: weakness, impaired endurance, impaired functional mobility, gait instability, impaired balance, impaired cardiopulmonary response to activity.    PT/OT co-evaluation due to anticipated complexity of pt's presentation. Pt's PLOF: MOD I with use of rollator. Pt at this time SBA with mobility with use of rollator. Therapy recommending low intensity therapy- HH PT with family assistance. Therapy will continue to progress pt as able.     Rehab Prognosis: Good; patient would benefit from acute skilled PT services to address these deficits and reach maximum level of function.    Recent Surgery: * No surgery found *      Plan:     During this hospitalization, patient to be seen 3 x/week to address the identified rehab impairments via gait training, therapeutic activities, therapeutic exercises, neuromuscular re-education and progress toward the following goals:    Plan of Care Expires:  01/09/25    Subjective     Chief Complaint: limited functional endurance  Patient/Family Comments/goals: to continue to progress functional mobility  Pain/Comfort:  Pain Rating 1: 0/10  Pain Rating Post-Intervention 1: 0/10    Patients cultural, spiritual, Orthodoxy conflicts given the current situation: no    Living Environment:  Lives with son and grand-daughter in 2 story home with threshold to enter; 1/2 bath downstairs.   Prior to admission, patients level of function was WILBER with use of rollator; requires assistance per son to navigate up/down stairs. .  Equipment used at home: rollator, cane,  straight, shower chair.  DME owned (not currently used): none.  Upon discharge, patient will have assistance from son/family.    Objective:     Communicated with Nurse prior to session.  Patient found HOB elevated with PureWick, bed alarm, oxygen  upon PT entry to room.    General Precautions: Standard, fall  Orthopedic Precautions:N/A   Braces: N/A  Respiratory Status: Nasal cannula, flow 2 L/min    Exams:  Cognitive Exam:  Patient is oriented to Person, Place, Time, and Situation  Sensation:    -       Intact  light/touch to BLE  RLE ROM: WFL  RLE Strength: WFL  LLE ROM: WFL  LLE Strength: WFL    Functional Mobility:  Bed Mobility:     Supine to Sit: contact guard assistance  Transfers:     Sit to Stand:  stand by assistance with rollator  Bed to Chair: stand by assistance with  rollator  using  Step Transfer  Gait: ~35ft with use of rollator and SBA  Stairs:  ascend/descend x1 step with use of RW as BHR with portable step in room; MIN A/CGA      AM-PAC 6 CLICK MOBILITY  Total Score:19       Treatment & Education:  Pt educated of role of therapy.   Verbal cues to improve upright trunk posture with mobility.   Pt able to complete self care at sink with SBA.   Pt educated on use of call button for mobility needs in room; pt understanding.     Patient left up in chair with all lines intact, call button in reach, chair alarm on, and Nurse notified.    GOALS:   Multidisciplinary Problems       Physical Therapy Goals          Problem: Physical Therapy    Goal Priority Disciplines Outcome Interventions   Physical Therapy Goal     PT, PT/OT Progressing    Description: Goals to be met by: 25     Patient will increase functional independence with mobility by performin. Supine to sit with Modified [place]  2. Sit to supine with Modified [place]  3. Sit to stand transfer with Modified [place] with use of rollator.   4. Bed to chair transfer with Modified [place] using rollator.  5. Gait  x 150  feet with Modified [place] using rollator.  6. Ascend/descend 5 stair with bilateral Handrails Modified [place].                         History:     Past Medical History:   Diagnosis Date    Essential (primary) hypertension        History reviewed. No pertinent surgical history.    Time Tracking:     PT Received On: 12/09/24  PT Start Time: 0926     PT Stop Time: 0953  PT Total Time (min): 27 min With OT    Billable Minutes: Evaluation 10 and Gait Training 10      12/09/2024

## 2024-12-10 PROBLEM — J96.01 ACUTE HYPOXIC RESPIRATORY FAILURE: Status: RESOLVED | Noted: 2024-12-07 | Resolved: 2024-12-10

## 2024-12-10 PROBLEM — E87.5 HYPERKALEMIA: Status: RESOLVED | Noted: 2024-12-07 | Resolved: 2024-12-10

## 2024-12-10 PROBLEM — I27.20 PULMONARY HYPERTENSION: Status: ACTIVE | Noted: 2024-12-10

## 2024-12-10 PROBLEM — R06.02 SHORTNESS OF BREATH: Status: RESOLVED | Noted: 2024-12-07 | Resolved: 2024-12-10

## 2024-12-10 PROBLEM — J06.9 VIRAL URI WITH COUGH: Status: ACTIVE | Noted: 2024-12-10

## 2024-12-10 PROBLEM — I50.31 ACUTE HEART FAILURE WITH PRESERVED EJECTION FRACTION (HFPEF): Status: ACTIVE | Noted: 2024-12-10

## 2024-12-10 LAB
ALBUMIN SERPL BCP-MCNC: 2.7 G/DL (ref 3.5–5.2)
ALP SERPL-CCNC: 63 U/L (ref 40–150)
ALT SERPL W/O P-5'-P-CCNC: 21 U/L (ref 10–44)
ANION GAP SERPL CALC-SCNC: 11 MMOL/L (ref 8–16)
AST SERPL-CCNC: 24 U/L (ref 10–40)
BASOPHILS # BLD AUTO: 0.02 K/UL (ref 0–0.2)
BASOPHILS NFR BLD: 0.2 % (ref 0–1.9)
BILIRUB SERPL-MCNC: 0.6 MG/DL (ref 0.1–1)
BUN SERPL-MCNC: 31 MG/DL (ref 8–23)
CALCIUM SERPL-MCNC: 8.6 MG/DL (ref 8.7–10.5)
CHLORIDE SERPL-SCNC: 108 MMOL/L (ref 95–110)
CO2 SERPL-SCNC: 21 MMOL/L (ref 23–29)
CREAT SERPL-MCNC: 1.2 MG/DL (ref 0.5–1.4)
DIFFERENTIAL METHOD BLD: ABNORMAL
EOSINOPHIL # BLD AUTO: 0 K/UL (ref 0–0.5)
EOSINOPHIL NFR BLD: 0.1 % (ref 0–8)
ERYTHROCYTE [DISTWIDTH] IN BLOOD BY AUTOMATED COUNT: 14 % (ref 11.5–14.5)
EST. GFR  (NO RACE VARIABLE): 48 ML/MIN/1.73 M^2
GLUCOSE SERPL-MCNC: 137 MG/DL (ref 70–110)
HCT VFR BLD AUTO: 29.7 % (ref 37–48.5)
HGB BLD-MCNC: 9.1 G/DL (ref 12–16)
IMM GRANULOCYTES # BLD AUTO: 0.06 K/UL (ref 0–0.04)
IMM GRANULOCYTES NFR BLD AUTO: 0.5 % (ref 0–0.5)
LYMPHOCYTES # BLD AUTO: 0.8 K/UL (ref 1–4.8)
LYMPHOCYTES NFR BLD: 6 % (ref 18–48)
MCH RBC QN AUTO: 28.3 PG (ref 27–31)
MCHC RBC AUTO-ENTMCNC: 30.6 G/DL (ref 32–36)
MCV RBC AUTO: 92 FL (ref 82–98)
MONOCYTES # BLD AUTO: 1 K/UL (ref 0.3–1)
MONOCYTES NFR BLD: 8 % (ref 4–15)
NEUTROPHILS # BLD AUTO: 10.9 K/UL (ref 1.8–7.7)
NEUTROPHILS NFR BLD: 85.2 % (ref 38–73)
NRBC BLD-RTO: 0 /100 WBC
OHS QRS DURATION: 76 MS
OHS QTC CALCULATION: 429 MS
PLATELET # BLD AUTO: 231 K/UL (ref 150–450)
PMV BLD AUTO: 11.3 FL (ref 9.2–12.9)
POCT GLUCOSE: 147 MG/DL (ref 70–110)
POCT GLUCOSE: 148 MG/DL (ref 70–110)
POCT GLUCOSE: 208 MG/DL (ref 70–110)
POCT GLUCOSE: 249 MG/DL (ref 70–110)
POTASSIUM SERPL-SCNC: 4.2 MMOL/L (ref 3.5–5.1)
PROT SERPL-MCNC: 6.3 G/DL (ref 6–8.4)
RBC # BLD AUTO: 3.22 M/UL (ref 4–5.4)
SODIUM SERPL-SCNC: 140 MMOL/L (ref 136–145)
VANCOMYCIN SERPL-MCNC: 25.8 UG/ML
WBC # BLD AUTO: 12.83 K/UL (ref 3.9–12.7)

## 2024-12-10 PROCEDURE — 97530 THERAPEUTIC ACTIVITIES: CPT

## 2024-12-10 PROCEDURE — 94640 AIRWAY INHALATION TREATMENT: CPT

## 2024-12-10 PROCEDURE — 63600175 PHARM REV CODE 636 W HCPCS

## 2024-12-10 PROCEDURE — 97535 SELF CARE MNGMENT TRAINING: CPT

## 2024-12-10 PROCEDURE — 36415 COLL VENOUS BLD VENIPUNCTURE: CPT | Performed by: INTERNAL MEDICINE

## 2024-12-10 PROCEDURE — 36415 COLL VENOUS BLD VENIPUNCTURE: CPT

## 2024-12-10 PROCEDURE — 25000003 PHARM REV CODE 250

## 2024-12-10 PROCEDURE — 80202 ASSAY OF VANCOMYCIN: CPT | Performed by: INTERNAL MEDICINE

## 2024-12-10 PROCEDURE — 80053 COMPREHEN METABOLIC PANEL: CPT

## 2024-12-10 PROCEDURE — 25000242 PHARM REV CODE 250 ALT 637 W/ HCPCS

## 2024-12-10 PROCEDURE — 11000001 HC ACUTE MED/SURG PRIVATE ROOM

## 2024-12-10 PROCEDURE — 94761 N-INVAS EAR/PLS OXIMETRY MLT: CPT

## 2024-12-10 PROCEDURE — 85025 COMPLETE CBC W/AUTO DIFF WBC: CPT

## 2024-12-10 PROCEDURE — 63600175 PHARM REV CODE 636 W HCPCS: Performed by: INTERNAL MEDICINE

## 2024-12-10 RX ORDER — FUROSEMIDE 10 MG/ML
60 INJECTION INTRAMUSCULAR; INTRAVENOUS ONCE
Status: COMPLETED | OUTPATIENT
Start: 2024-12-10 | End: 2024-12-10

## 2024-12-10 RX ORDER — INSULIN ASPART 100 [IU]/ML
0-5 INJECTION, SOLUTION INTRAVENOUS; SUBCUTANEOUS
Status: DISCONTINUED | OUTPATIENT
Start: 2024-12-10 | End: 2024-12-11 | Stop reason: HOSPADM

## 2024-12-10 RX ORDER — IBUPROFEN 200 MG
16 TABLET ORAL
Status: DISCONTINUED | OUTPATIENT
Start: 2024-12-10 | End: 2024-12-11 | Stop reason: HOSPADM

## 2024-12-10 RX ORDER — INSULIN ASPART 100 [IU]/ML
0-10 INJECTION, SOLUTION INTRAVENOUS; SUBCUTANEOUS
Status: DISCONTINUED | OUTPATIENT
Start: 2024-12-10 | End: 2024-12-10

## 2024-12-10 RX ORDER — GLUCAGON 1 MG
1 KIT INJECTION
Status: DISCONTINUED | OUTPATIENT
Start: 2024-12-10 | End: 2024-12-11 | Stop reason: HOSPADM

## 2024-12-10 RX ORDER — IBUPROFEN 200 MG
24 TABLET ORAL
Status: DISCONTINUED | OUTPATIENT
Start: 2024-12-10 | End: 2024-12-11 | Stop reason: HOSPADM

## 2024-12-10 RX ADMIN — ATENOLOL 100 MG: 25 TABLET ORAL at 08:12

## 2024-12-10 RX ADMIN — ERYTHROMYCIN: 5 OINTMENT OPHTHALMIC at 05:12

## 2024-12-10 RX ADMIN — DOXAZOSIN 8 MG: 2 TABLET ORAL at 09:12

## 2024-12-10 RX ADMIN — CLOPIDOGREL BISULFATE 75 MG: 75 TABLET ORAL at 08:12

## 2024-12-10 RX ADMIN — ENOXAPARIN SODIUM 40 MG: 40 INJECTION SUBCUTANEOUS at 05:12

## 2024-12-10 RX ADMIN — PREDNISONE 40 MG: 20 TABLET ORAL at 08:12

## 2024-12-10 RX ADMIN — INSULIN GLARGINE 7 UNITS: 100 INJECTION, SOLUTION SUBCUTANEOUS at 09:12

## 2024-12-10 RX ADMIN — IPRATROPIUM BROMIDE AND ALBUTEROL SULFATE 3 ML: .5; 3 SOLUTION RESPIRATORY (INHALATION) at 08:12

## 2024-12-10 RX ADMIN — AMLODIPINE BESYLATE 10 MG: 5 TABLET ORAL at 08:12

## 2024-12-10 RX ADMIN — INSULIN ASPART 1 UNITS: 100 INJECTION, SOLUTION INTRAVENOUS; SUBCUTANEOUS at 09:12

## 2024-12-10 RX ADMIN — INSULIN ASPART 2 UNITS: 100 INJECTION, SOLUTION INTRAVENOUS; SUBCUTANEOUS at 05:12

## 2024-12-10 RX ADMIN — ERYTHROMYCIN: 5 OINTMENT OPHTHALMIC at 02:12

## 2024-12-10 RX ADMIN — FUROSEMIDE 60 MG: 10 INJECTION, SOLUTION INTRAMUSCULAR; INTRAVENOUS at 08:12

## 2024-12-10 RX ADMIN — SERTRALINE HYDROCHLORIDE 50 MG: 50 TABLET ORAL at 08:12

## 2024-12-10 RX ADMIN — ATORVASTATIN CALCIUM 40 MG: 40 TABLET, FILM COATED ORAL at 09:12

## 2024-12-10 RX ADMIN — LOSARTAN POTASSIUM 100 MG: 50 TABLET, FILM COATED ORAL at 08:12

## 2024-12-10 RX ADMIN — IPRATROPIUM BROMIDE AND ALBUTEROL SULFATE 3 ML: .5; 3 SOLUTION RESPIRATORY (INHALATION) at 01:12

## 2024-12-10 NOTE — PROGRESS NOTES
"[facility] Medicine Progress Note    Primary Team: [facility] Hospitalist Team A  Attending Physician: Azeb Avendano MD  Resident: Mena  Intern: Kwadwo    Subjective:      NAEON. This morning patient laying in bed. Comfortable. States is feeling well without complaints. No pain around eye at rest or with tracking.      Objective:     Last 24 Hour Vital Signs:  BP  Min: 143/63  Max: 175/70  Temp  Av.9 °F (36.6 °C)  Min: 97.6 °F (36.4 °C)  Max: 98.5 °F (36.9 °C)  Pulse  Av.9  Min: 58  Max: 83  Resp  Av.4  Min: 16  Max: 20  SpO2  Av.1 %  Min: 94 %  Max: 98 %  Height  Av' 3" (160 cm)  Min: 5' 3" (160 cm)  Max: 5' 3" (160 cm)  Weight  Av.3 kg (230 lb)  Min: 104.3 kg (230 lb)  Max: 104.3 kg (230 lb)  I/O last 3 completed shifts:  In: 712 [P.O.:712]  Out: 4350 [Urine:4350]    Physical Examination:    BP (!) 151/67 (BP Location: Right arm, Patient Position: Lying)   Pulse (!) 58   Temp 97.6 °F (36.4 °C) (Oral)   Resp 19   Ht 5' 3" (1.6 m)   Wt 104.3 kg (230 lb)   SpO2 98%   BMI 40.74 kg/m²     General Appearance:    Alert, cooperative, no distress, appears stated age   Head:    Bruising around left orbit with skin breakdown. Small green discharge at corners of the eye.    Eyes:    PERRL, conjunctival hemorrhage present in Left , EOM's intact, fundi     benign, both eyes   Ears:    Normal TM's and external ear canals, both ears   Nose:   Nares normal, septum midline, mucosa normal, no drainage    or sinus tenderness   Throat:   Lips, mucosa, and tongue normal; missing teeth, gold caps on incisors    Neck:   Supple, symmetrical, trachea midline, no adenopathy;     thyroid:  no enlargement/tenderness/nodules; no carotid    bruit or JVD   Back:     Symmetric, no curvature, ROM normal, no CVA tenderness   Lungs:     End expiratory wheeze present, rhonchi present b/l   Chest Wall:    No tenderness or deformity    Heart:    Regular rate and rhythm, S1 and S2 normal, no murmur, rub   or gallop "       Abdomen:     Soft, non-tender, bowel sounds active all four quadrants,     no masses, no organomegaly           Extremities:   Extremities normal, atraumatic, no cyanosis or edema   Pulses:   2+ and symmetric all extremities   Skin:   Skin color, texture, turgor normal, no rashes or lesions   Lymph nodes:   Cervical, supraclavicular, and axillary nodes normal   Neurologic:   CNII-XII intact, normal strength, sensation and reflexes     throughout         Other Results:  EKG (my interpretation): .sinus rate and rhythm, Normal intervals, normal axis, no acute ST or TWA    Current Medications:     Infusions:       Scheduled:   albuterol-ipratropium  3 mL Nebulization Q6H WAKE    amLODIPine  10 mg Oral Daily    atenoloL  100 mg Oral Daily    atorvastatin  40 mg Oral QHS    clopidogreL  75 mg Oral Daily    doxazosin  8 mg Oral QHS    enoxparin  40 mg Subcutaneous Daily    erythromycin   Left Eye Q6H    insulin glargine U-100  7 Units Subcutaneous QHS    losartan  100 mg Oral Daily    predniSONE  40 mg Oral Daily    sertraline  50 mg Oral Daily        PRN:    Current Facility-Administered Medications:     dextrose 10%, 12.5 g, Intravenous, PRN    dextrose 10%, 12.5 g, Intravenous, PRN    dextrose 10%, 25 g, Intravenous, PRN    dextrose 10%, 25 g, Intravenous, PRN    glucagon (human recombinant), 1 mg, Intramuscular, PRN    glucagon (human recombinant), 1 mg, Intramuscular, PRN    glucose, 16 g, Oral, PRN    glucose, 16 g, Oral, PRN    glucose, 24 g, Oral, PRN    glucose, 24 g, Oral, PRN    insulin aspart U-100, 0-10 Units, Subcutaneous, QID (AC + HS) PRN    Pharmacy to dose Vancomycin consult, , , Once **AND** vancomycin - pharmacy to dose, , Intravenous, pharmacy to manage frequency    Antibiotics and Day Number of Therapy:  None      Assessment:      Christine Delgadillo is a 74 y.o. female with PMHx of HTN, falls and morbid obesity who presented to [facility] on 12/7/2024 for SOB x 1 week, worsened in last 24hr.  Patient  is admitted to [facility] for Acute Hypoxic Respiratory failure 2/2 HFpEF exacerbation over URI     Plan:     Acute Hypoxic Respiratory Failure  SOB  Wheezing   HFpEF exacerbation / Upper Respiratory Infection  Pt states she is intermittently SOB at baseline to the degree of being limited to mobility bound within the home. Additionally with chronic, nonproductive cough intermittently at baseline. Uses a walker. Pt endorses worsened SOB for a week and progressively worsen x 1 day. Likely given remote hx of tobacco use, large body habitus pt with underlying restrictive lung disease and  possibly complicated by an acute upper viral infection. However this pt did not have resolution of symptoms with therapy in ED so further evaluation warranted broadening differential to a cardiac cause. POCUS completed at bedside with limited view given body habitus, but did not see obvious fluid or concerningly low EF.   - flu, COVID neg  -    - CXR WNL  - TTE notable for G2 Diastolic dysfunction, PA pressure 65 indicating likely PH  - supplemental oxygen as needed and wean as tolerated; if remains hypoxic or unable to wean oxygen, complete 6 min walk test prior to discharge  - continuing diuresis with lasix 40 IV   - Continue Prednisone and duonebs today     Electrolyte derangements  Hyperkalemia (resolved)  - CTM BMP    Left Periorbital contusion, POA  Preseptal orbital cellulitis  With periorbital edema, sclera is blood shot, with periorbital bruising noted and yellowish green purulent drainage from skin opening. No proptosis, ophthalmoplegia or vision impairment  - ordered vancomycin, ordered erythromycin ointment  - continue previous recommendation of following up with ophthalmology outpatient     HTN  - reported home medication atenolol 100mg daily, amlodipine 10mg daily, losartan 100 daily, doxazosin     HLD  - reported home medication atorvastatin 40mg daily     Obesity  - consider referral to pulm outpatient for  sleep study  - consider referral to bariatrics for weight loss management     HCM  - recommend colonoscopy  - recommend immunizations, pt declined Flu/COVID/Pneumovax/RSV     Diet: diabetic  Code: .full  DVT ppx: lovenox  Dispo: home when stable  Estimated LOS: 24-48 hourse    Giovanny San MD  [facility] -III    [facility] Hospitalist Pager numbers:   [facility] Hospitalist Medicine Team A (Dominic/Romana): [phone]  [facility] Hospitalist Medicine Team B (Catracho/Raymond):  [phone]

## 2024-12-10 NOTE — PLAN OF CARE
Home Oxygen Evaluation    Date Performed: 12/10/2024    1) Patient's Home O2 Sat on room air, while at rest: 95%        If O2 sats on room air at rest are 88% or below, patient qualifies. No additional testing needed. Document N/A in steps 2 and 3. If 89% or above, complete steps 2.      2) Patient's O2 Sat on room air while exercisin%        If O2 sats on room air while exercising remain 89% or above patient does not qualify, no further testing needed Document N/A in step 3. If O2 sats on room air while exercising are 88% or below, continue to step 3.      3) Patient's O2 Sat while exercising on O2: 94% at 2 LPM         (Must show improvement from #2 for patients to qualify)    If O2 sats improve on oxygen, patient qualifies for portable oxygen. If not, the patient does not qualify.      CODY Hernandez MD  U Team A  [facility] Internal Medicine,  - III

## 2024-12-10 NOTE — PLAN OF CARE
Pt received on 2lpm NC with SpO2  98%. No respiratory distress noted. Will continue to monitor. The proper method of use, as well as anticipated side effects, of this aerosol treatment are discussed and demonstrated to the patient.

## 2024-12-10 NOTE — PLAN OF CARE
Problem: Infection  Goal: Absence of Infection Signs and Symptoms  Outcome: Progressing     Problem: Adult Inpatient Plan of Care  Goal: Plan of Care Review  Outcome: Progressing  Goal: Optimal Comfort and Wellbeing  Outcome: Progressing     Problem: Bariatric Environmental Safety  Goal: Safety Maintained with Care  Outcome: Progressing     Problem: Diabetes Comorbidity  Goal: Blood Glucose Level Within Targeted Range  Outcome: Progressing     Problem: Comorbidity Management  Goal: Blood Pressure in Desired Range  Outcome: Progressing     Problem: Skin or Soft Tissue Infection  Goal: Absence of Infection Signs and Symptoms  Outcome: Progressing     Problem: ARDS (Acute Respiratory Distress Syndrome)  Goal: Effective Oxygenation  Outcome: Progressing     Problem: Skin Injury Risk Increased  Goal: Skin Health and Integrity  Outcome: Progressing     Echo done for the patient. Patient ambulated going to bathroom with use of walker. Tolerated well.

## 2024-12-10 NOTE — PT/OT/SLP PROGRESS
Occupational Therapy   Treatment    Name: Christine Delgadillo  MRN: [id number]  Admitting Diagnosis:  Acute hypoxic respiratory failure       Recommendations:     Discharge Recommendations: Low Intensity Therapy  Discharge Equipment Recommendations:  bedside commode  Barriers to discharge:  Other (Comment) (SOB upon exertion, poor endurance)    Assessment:     Christine Delgadillo is a 74 y.o. female with a medical diagnosis of Acute hypoxic respiratory failure.  She presents with The primary encounter diagnosis was Wheezing. Diagnoses of SOB (shortness of breath), Shortness of breath, Acute hypoxic respiratory failure, Preseptal cellulitis, Morbid obesity, Primary hypertension, Morbid (severe) obesity with alveolar hypoventilation, Acute heart failure with preserved ejection fraction (HFpEF), Viral URI with cough, and Pulmonary hypertension were also pertinent to this visit. Performance deficits affecting function are weakness, impaired endurance, impaired balance, impaired self care skills, impaired functional mobility, decreased lower extremity function.     Rehab Prognosis:  Good; patient would benefit from acute skilled OT services to address these deficits and reach maximum level of function.       Plan:     Patient to be seen 3 x/week to address the above listed problems via self-care/home management, therapeutic activities, therapeutic exercises  Plan of Care Expires: 01/10/25  Plan of Care Reviewed with: patient    Subjective     Chief Complaint: Feeling cold.   Patient/Family Comments/goals: Return home to [facility].   Pain/Comfort:  Pain Rating 1: 0/10  Pain Rating Post-Intervention 2: 0/10    Objective:     Communicated with: sang prior to session.  Patient found supine with bed alarm, telemetry, peripheral IV, PureWick upon OT entry to room.    General Precautions: Standard, fall    Orthopedic Precautions:N/A  Braces: N/A  Respiratory Status: Nasal cannula, flow 0.5 L/min     Occupational Performance:     Bed Mobility:     Patient completed Supine to Sit with stand by assistance     Functional Mobility/Transfers:  Patient completed Sit <> Stand Transfer with contact guard assistance  with  rolling walker   Patient completed Bed <> Chair Transfer using Step Transfer technique with stand by assistance with rollator   Functional Mobility: Patient ambulates ~60 feet using her rollator, requiring SBA-CGA. Patient w/ no LOB. O2 saturation monitored during functional mobility.     Activities of Daily Living:  Grooming: stand by assistance to perform oral care in stance.       [facility] 6 Click ADL: 20    Treatment & Education:  Pt educated on purpose/role of OT. RN requests OT monitor patient O2 saturation throughout session.   OT educates patient on pursed lip breathing technique.   Patient requires SBA-CGA using rollator for functional mobility.     Patient on RA upon arrival. O2 monitored throughout session and are as follows:   -Supine and at rest: 91% that increased to 96% w/ pursed lip breathing .  -Standing at the sink performing oral care: 91-93%.  -During ambulation trial: 93-87%.   -Seated EOB post ambulation trial; pt performs pursed lip breathin%   -OT donns 0.5L O2 and it increased to 95%.   -O2 removed after ~5 min once seated in chair and O2 remaining at 93- 95%.           Patient left up in chair with all lines intact, call button in reach, chair alarm on, and nsg notified    GOALS:   Multidisciplinary Problems       Occupational Therapy Goals          Problem: Occupational Therapy    Goal Priority Disciplines Outcome Interventions   Occupational Therapy Goal     OT, PT/OT Progressing    Description: Goals to be met by: 25     Patient will increase functional independence with ADLs by performing:    UE Dressing with Modified [place].  LE Dressing with Modified [place].  Grooming while standing at sink with Modified [place].  Toileting from toilet with Modified [place] for hygiene and clothing  management.   Toilet transfer to toilet with Modified [place].                         Time Tracking:     OT Date of Treatment: 12/10/24  OT Start Time: 1046  OT Stop Time: 1112  OT Total Time (min): 26 min    Billable Minutes:Self Care/Home Management 13  Therapeutic Activity 10    OT/KATE: OT          12/10/2024

## 2024-12-10 NOTE — MEDICAL/APP STUDENT
"[facility] Medicine Progress Note    Primary Team: [facility] Hospitalist Team A  Attending Physician: Azeb Avendano MD  Resident: Giovanny  Intern: Re    Date of Admit: 2024     Chief Complaint: Acute respiratory failure     Subjective/Interval Events:      NAEON. BP ranged 150-170/60-70's overnight. 98% this morning with 2L NC. Patient reports her SOB has improved. She denies any fever, chills, vision changes, eye pain, CP, ab pain.     Objective:   Last 24 Hour Vital Signs:  BP  Min: 143/63  Max: 175/70  Temp  Av.9 °F (36.6 °C)  Min: 97.6 °F (36.4 °C)  Max: 98.5 °F (36.9 °C)  Pulse  Av.9  Min: 58  Max: 83  Resp  Av.4  Min: 16  Max: 20  SpO2  Av.1 %  Min: 94 %  Max: 98 %  Height  Av' 3" (160 cm)  Min: 5' 3" (160 cm)  Max: 5' 3" (160 cm)  Weight  Av.3 kg (230 lb)  Min: 104.3 kg (230 lb)  Max: 104.3 kg (230 lb)    Intake/Output Summary (Last 24 hours) at 12/10/2024 0718  Last data filed at 12/10/2024 0533  Gross per 24 hour   Intake 236 ml   Output 2200 ml   Net -1964 ml       Physical Examination:  Physical Exam  Constitutional:       Appearance: She is obese.   HENT:      Head: Normocephalic.   Eyes:      General:         Left eye: Discharge (Yellow/green discharge) present.     Pupils: Pupils are equal, round, and reactive to light.      Comments: L sclera bloodshot/bright red   Cardiovascular:      Rate and Rhythm: Normal rate.      Pulses: Normal pulses.      Heart sounds: Normal heart sounds.   Pulmonary:      Breath sounds: Wheezing present.   Abdominal:      General: Abdomen is flat.      Palpations: Abdomen is soft.      Tenderness: There is no abdominal tenderness.   Musculoskeletal:      Cervical back: Normal range of motion.      Right lower leg: No edema.      Left lower leg: No edema.   Skin:     General: Skin is warm.      Findings: Bruising (L eye; purulent discharge; periorbital hematoma) present.   Neurological:      General: No focal deficit present.      Mental " Status: She is alert and oriented to person, place, and time.      Cranial Nerves: No cranial nerve deficit.   Psychiatric:         Mood and Affect: Mood normal.          Laboratory:  Recent Labs   Lab 12/07/24  1005 12/07/24 2024 12/08/24  0842 12/09/24  0722 12/09/24  0854 12/10/24  0433   WBC 14.06*  --  15.11* 12.46  --   --    HGB 9.9*  --  9.6* 9.7*  --   --    HCT 31.1*  --  30.3* 33.8*  --   --      --  242 196  --   --    MCV 91  --  90 99*  --   --    RDW 14.8*  --  14.7* 14.8*  --   --       < > 141  --  140 140   K 5.5*   < > 4.5  --  4.3 4.2   *   < > 111*  --  109 108   CO2 20*   < > 20*  --  22* 21*   BUN 17   < > 22  --  27* 31*   CREATININE 1.2   < > 1.2  --  1.3 1.2   GLU 93   < > 167*  --  148* 137*   PROT 7.8  --  7.1  --  6.5 6.3   ALBUMIN 3.4*  --  3.2*  --  2.9* 2.7*   BILITOT 1.2*  --  1.4*  --  1.1* 0.6   AST 31  --  29  --  29 24   ALKPHOS 74  --  69  --  61 63   ALT 19  --  20  --  22 21    < > = values in this interval not displayed.       Microbiology:  Influenza A and B negative  COVID negative    Imaging:  CXR: no acute changes. Lung is upper normal in size. Symmetric. No pleural fluid or pneumothorax    Current Medications:     Scheduled:   albuterol-ipratropium  3 mL Nebulization Q6H WAKE    amLODIPine  10 mg Oral Daily    atenoloL  100 mg Oral Daily    atorvastatin  40 mg Oral QHS    clopidogreL  75 mg Oral Daily    doxazosin  8 mg Oral QHS    enoxparin  40 mg Subcutaneous Daily    erythromycin   Left Eye Q6H    insulin glargine U-100  7 Units Subcutaneous QHS    losartan  100 mg Oral Daily    predniSONE  40 mg Oral Daily    sertraline  50 mg Oral Daily         Infusions:       PRN:    Current Facility-Administered Medications:     dextrose 10%, 12.5 g, Intravenous, PRN    dextrose 10%, 12.5 g, Intravenous, PRN    dextrose 10%, 25 g, Intravenous, PRN    dextrose 10%, 25 g, Intravenous, PRN    glucagon (human recombinant), 1 mg, Intramuscular, PRN    glucagon  (human recombinant), 1 mg, Intramuscular, PRN    glucose, 16 g, Oral, PRN    glucose, 16 g, Oral, PRN    glucose, 24 g, Oral, PRN    glucose, 24 g, Oral, PRN    insulin aspart U-100, 0-5 Units, Subcutaneous, QID (AC + HS) PRN    Pharmacy to dose Vancomycin consult, , , Once **AND** vancomycin - pharmacy to dose, , Intravenous, pharmacy to manage frequency    Assessment:     Christine Delgadillo is a 74 y.o. female with a PMHx of HTN, T2DM, HLD, history of cigarette smoking presenting with SOB. Patient does not have a diagnosed history of COPD but has a history of cigarette smoking with 5 pack year. Patient also reports history of exertional dyspnea and shortness of breath that has been going on for about 2 weeks that became worse today.      Plan:     Acute hypoxic respiratory failure    Pt states she is intermittently SOB at baseline to the degree of being limited to mobility bound within the home. Additionally with chronic, nonproductive cough intermittently at baseline. Uses a walker. Pt endorses worsened SOB for a week and progressively worsen x 1 day. Likely given remote hx of tobacco use, large body habitus pt with underlying restrictive lung disease and  possibly complicated by an acute upper viral infection. However this pt did not have resolution of symptoms with therapy in ED so further evaluation warranted broadening differential to a cardiac cause. POCUS completed at bedside with limited view given body habitus, but did not see obvious fluid or concerningly low EF.   - flu, COVID neg  -   - CXR no acute findings on admission  - TTE done 12/9   - normal systolic function Ejection fraction of 55-60%.   - Grade II diastolic dysfunction. Elevated LV filling pressure  Plan:   - supplemental O2 as tolerated   - continue diuresis with lasix 40iv  - 2.2L urine output past 24 hours  - continue prednisone and duonebs treatment    Recent Fall/Trauma  #periorbital contusion  #preseptal cellulitis/infection  - he  was recently presented to the ED on 12/01 from a periorbital contusion from a fall from the bed during a sleep.  - L eye is edematous and purulent; Patient denies any vision changes or pain with eye movement  Plan:  - vancomycin dosed by pharmacy for possible preseptal cellulitis covering MRSA     Leukocytosis  - possible infection of the L eye  Plan:  - continue vancomycin and erythromycin eye ointment  - WBC improved 12/9  - monitor daily CBC       Hypertension  - initial blood pressure 162/72  Plan:  - Continue home meds:   - Losartan 100mg PO daily  - amlodipine 10mg PO daily   - doxazosin 9mg PO nightly  - continue to monitor BP     Anemia, Normocytic  - on admission, Hgb 9.9, Hct 31.1, MVC 91, iron 22, TIBC 246, ferretin 608, folate 8.7, B12 596  - low iron, high Ferritin, normocytic  Plan:  - iron supplementation    HLD:  Plan:  - lipid panel - normal  - continue home Lipitor 40mg nightly     T2DM  - patient takes 15 units of insulin nightly  Plan:  - monitor POCT  - sliding scale insulin      CKD3A  - patient's eGFR 48 and Cr 1.2 on admission (around her baseline)  Plan:  Continue to monitor      Obesity:  - BMI 40.74  Plan:  Outpatient follow up on discharge    Diet: Diabetic diet   Code: Full  Dispo: Home when stable      Jaeyeon Kweon [facility] MS4       [facility] Medicine Hospitalist Pager numbers:   [facility] Hospitalist Medicine Team A (Dominic/Romana): [phone]  [facility] Hospitalist Medicine Team B (Catracho/Raymond):  [phone]

## 2024-12-10 NOTE — PLAN OF CARE
Problem: Adult Inpatient Plan of Care  Goal: Patient-Specific Goal (Individualized)  Outcome: Progressing     Problem: Bariatric Environmental Safety  Goal: Safety Maintained with Care  Outcome: Progressing     Problem: Diabetes Comorbidity  Goal: Blood Glucose Level Within Targeted Range  Outcome: Progressing     Problem: Comorbidity Management  Goal: Blood Pressure in Desired Range  Outcome: Progressing     Problem: ARDS (Acute Respiratory Distress Syndrome)  Goal: Effective Oxygenation  Outcome: Progressing     Problem: Skin Injury Risk Increased  Goal: Skin Health and Integrity  Outcome: Progressing    AAOx4. Medications administered per MAR. Glucose monitored. Safety precautions maintained. Call bell within reach.

## 2024-12-10 NOTE — PLAN OF CARE
Pt sat's maintain at 100% on 2.5 LPM nasal cannula. Pt taken off of oxygen, will check again after breakfast.

## 2024-12-10 NOTE — PLAN OF CARE
Problem: Adult Inpatient Plan of Care  Goal: Optimal Comfort and Wellbeing  Outcome: Progressing     Problem: Diabetes Comorbidity  Goal: Blood Glucose Level Within Targeted Range  Outcome: Progressing     Problem: Comorbidity Management  Goal: Blood Pressure in Desired Range  Outcome: Progressing     Problem: ARDS (Acute Respiratory Distress Syndrome)  Goal: Effective Oxygenation  Outcome: Progressing

## 2024-12-11 ENCOUNTER — TELEPHONE (OUTPATIENT)
Dept: BARIATRICS | Facility: CLINIC | Age: 74
End: 2024-12-11
Payer: MEDICARE

## 2024-12-11 VITALS
WEIGHT: 230 LBS | BODY MASS INDEX: 40.75 KG/M2 | DIASTOLIC BLOOD PRESSURE: 65 MMHG | OXYGEN SATURATION: 97 % | TEMPERATURE: 98 F | RESPIRATION RATE: 20 BRPM | HEART RATE: 62 BPM | SYSTOLIC BLOOD PRESSURE: 133 MMHG | HEIGHT: 63 IN

## 2024-12-11 PROBLEM — N17.9 AKI (ACUTE KIDNEY INJURY): Status: ACTIVE | Noted: 2024-12-11

## 2024-12-11 PROBLEM — J06.9 VIRAL URI WITH COUGH: Status: RESOLVED | Noted: 2024-12-10 | Resolved: 2024-12-11

## 2024-12-11 PROBLEM — I50.31 ACUTE HEART FAILURE WITH PRESERVED EJECTION FRACTION (HFPEF): Status: RESOLVED | Noted: 2024-12-10 | Resolved: 2024-12-11

## 2024-12-11 LAB
ALBUMIN SERPL BCP-MCNC: 2.9 G/DL (ref 3.5–5.2)
ALP SERPL-CCNC: 60 U/L (ref 40–150)
ALT SERPL W/O P-5'-P-CCNC: 20 U/L (ref 10–44)
ANION GAP SERPL CALC-SCNC: 10 MMOL/L (ref 8–16)
AST SERPL-CCNC: 21 U/L (ref 10–40)
BASOPHILS # BLD AUTO: 0.02 K/UL (ref 0–0.2)
BASOPHILS NFR BLD: 0.1 % (ref 0–1.9)
BILIRUB SERPL-MCNC: 0.5 MG/DL (ref 0.1–1)
BUN SERPL-MCNC: 41 MG/DL (ref 8–23)
CALCIUM SERPL-MCNC: 8.6 MG/DL (ref 8.7–10.5)
CHLORIDE SERPL-SCNC: 105 MMOL/L (ref 95–110)
CO2 SERPL-SCNC: 21 MMOL/L (ref 23–29)
CREAT SERPL-MCNC: 1.6 MG/DL (ref 0.5–1.4)
DIFFERENTIAL METHOD BLD: ABNORMAL
EOSINOPHIL # BLD AUTO: 0 K/UL (ref 0–0.5)
EOSINOPHIL NFR BLD: 0.1 % (ref 0–8)
ERYTHROCYTE [DISTWIDTH] IN BLOOD BY AUTOMATED COUNT: 13.7 % (ref 11.5–14.5)
EST. GFR  (NO RACE VARIABLE): 34 ML/MIN/1.73 M^2
GLUCOSE SERPL-MCNC: 136 MG/DL (ref 70–110)
HCT VFR BLD AUTO: 29.1 % (ref 37–48.5)
HGB BLD-MCNC: 9.2 G/DL (ref 12–16)
IMM GRANULOCYTES # BLD AUTO: 0.08 K/UL (ref 0–0.04)
IMM GRANULOCYTES NFR BLD AUTO: 0.5 % (ref 0–0.5)
LYMPHOCYTES # BLD AUTO: 1.2 K/UL (ref 1–4.8)
LYMPHOCYTES NFR BLD: 7.9 % (ref 18–48)
MCH RBC QN AUTO: 28.1 PG (ref 27–31)
MCHC RBC AUTO-ENTMCNC: 31.6 G/DL (ref 32–36)
MCV RBC AUTO: 89 FL (ref 82–98)
MONOCYTES # BLD AUTO: 1.4 K/UL (ref 0.3–1)
MONOCYTES NFR BLD: 9.1 % (ref 4–15)
NEUTROPHILS # BLD AUTO: 12.2 K/UL (ref 1.8–7.7)
NEUTROPHILS NFR BLD: 82.3 % (ref 38–73)
NRBC BLD-RTO: 0 /100 WBC
PLATELET # BLD AUTO: 256 K/UL (ref 150–450)
PMV BLD AUTO: 10.6 FL (ref 9.2–12.9)
POCT GLUCOSE: 140 MG/DL (ref 70–110)
POCT GLUCOSE: 159 MG/DL (ref 70–110)
POTASSIUM SERPL-SCNC: 4 MMOL/L (ref 3.5–5.1)
PROT SERPL-MCNC: 6.4 G/DL (ref 6–8.4)
RBC # BLD AUTO: 3.27 M/UL (ref 4–5.4)
SODIUM SERPL-SCNC: 136 MMOL/L (ref 136–145)
WBC # BLD AUTO: 14.88 K/UL (ref 3.9–12.7)

## 2024-12-11 PROCEDURE — 80053 COMPREHEN METABOLIC PANEL: CPT

## 2024-12-11 PROCEDURE — 25000242 PHARM REV CODE 250 ALT 637 W/ HCPCS

## 2024-12-11 PROCEDURE — 63600175 PHARM REV CODE 636 W HCPCS

## 2024-12-11 PROCEDURE — 94761 N-INVAS EAR/PLS OXIMETRY MLT: CPT

## 2024-12-11 PROCEDURE — 85025 COMPLETE CBC W/AUTO DIFF WBC: CPT

## 2024-12-11 PROCEDURE — 97116 GAIT TRAINING THERAPY: CPT | Mod: CQ

## 2024-12-11 PROCEDURE — 97110 THERAPEUTIC EXERCISES: CPT | Mod: CQ

## 2024-12-11 PROCEDURE — 25000003 PHARM REV CODE 250

## 2024-12-11 PROCEDURE — 36415 COLL VENOUS BLD VENIPUNCTURE: CPT

## 2024-12-11 PROCEDURE — 94640 AIRWAY INHALATION TREATMENT: CPT

## 2024-12-11 PROCEDURE — 99900035 HC TECH TIME PER 15 MIN (STAT)

## 2024-12-11 RX ORDER — ERYTHROMYCIN 5 MG/G
OINTMENT OPHTHALMIC EVERY 6 HOURS
Qty: 3.5 G | Refills: 0 | Status: SHIPPED | OUTPATIENT
Start: 2024-12-11 | End: 2024-12-18

## 2024-12-11 RX ORDER — ASPIRIN 81 MG/1
81 TABLET ORAL DAILY
Qty: 30 TABLET | Refills: 11 | Status: SHIPPED | OUTPATIENT
Start: 2024-12-11 | End: 2025-12-11

## 2024-12-11 RX ORDER — DOXYCYCLINE 100 MG/1
100 CAPSULE ORAL EVERY 12 HOURS
Qty: 6 CAPSULE | Refills: 0 | Status: SHIPPED | OUTPATIENT
Start: 2024-12-11 | End: 2024-12-14

## 2024-12-11 RX ORDER — ALBUTEROL SULFATE 90 UG/1
2 INHALANT RESPIRATORY (INHALATION) EVERY 6 HOURS PRN
Qty: 6.7 G | Refills: 0 | Status: SHIPPED | OUTPATIENT
Start: 2024-12-11

## 2024-12-11 RX ORDER — PREDNISONE 20 MG/1
40 TABLET ORAL DAILY
Qty: 4 TABLET | Refills: 0 | Status: SHIPPED | OUTPATIENT
Start: 2024-12-12 | End: 2024-12-14

## 2024-12-11 RX ADMIN — ERYTHROMYCIN: 5 OINTMENT OPHTHALMIC at 05:12

## 2024-12-11 RX ADMIN — ERYTHROMYCIN: 5 OINTMENT OPHTHALMIC at 12:12

## 2024-12-11 RX ADMIN — IPRATROPIUM BROMIDE AND ALBUTEROL SULFATE 3 ML: .5; 3 SOLUTION RESPIRATORY (INHALATION) at 08:12

## 2024-12-11 RX ADMIN — IPRATROPIUM BROMIDE AND ALBUTEROL SULFATE 3 ML: .5; 3 SOLUTION RESPIRATORY (INHALATION) at 01:12

## 2024-12-11 RX ADMIN — LOSARTAN POTASSIUM 100 MG: 50 TABLET, FILM COATED ORAL at 08:12

## 2024-12-11 RX ADMIN — AMLODIPINE BESYLATE 10 MG: 5 TABLET ORAL at 08:12

## 2024-12-11 RX ADMIN — ATENOLOL 100 MG: 25 TABLET ORAL at 08:12

## 2024-12-11 RX ADMIN — CLOPIDOGREL BISULFATE 75 MG: 75 TABLET ORAL at 08:12

## 2024-12-11 RX ADMIN — SERTRALINE HYDROCHLORIDE 50 MG: 50 TABLET ORAL at 08:12

## 2024-12-11 RX ADMIN — PREDNISONE 40 MG: 20 TABLET ORAL at 08:12

## 2024-12-11 NOTE — PROGRESS NOTES
"[facility] Medicine Progress Note    Primary Team: [facility] Hospitalist Team A  Attending Physician: Azeb Avendano MD  Resident: Mena  Intern: Kwadwo    Subjective:      NAEON. This morning patient sitting up in bed, eating breakfast. States breathing is at her baseline and feels well. Denies any pain around eye or pain with movement.      Objective:     Last 24 Hour Vital Signs:  BP  Min: 129/44  Max: 168/96  Temp  Av.2 °F (36.8 °C)  Min: 97.6 °F (36.4 °C)  Max: 98.8 °F (37.1 °C)  Pulse  Av.7  Min: 56  Max: 66  Resp  Av.5  Min: 18  Max: 20  SpO2  Av.8 %  Min: 92 %  Max: 96 %  I/O last 3 completed shifts:  In: 360 [P.O.:360]  Out: 3175 [Urine:3175]    Physical Examination:    BP (!) 150/70   Pulse 62   Temp 98 °F (36.7 °C) (Oral)   Resp 18   Ht 5' 3" (1.6 m)   Wt 104.3 kg (230 lb)   SpO2 (!) 94%   BMI 40.74 kg/m²     General Appearance:    Alert, cooperative, no distress, appears stated age   Head:    Bruising around left orbit with skin breakdown. Discharge now absent    Eyes:    PERRL, conjunctival hemorrhage present in Left , EOM's intact, fundi     benign, both eyes   Ears:    Normal TM's and external ear canals, both ears   Nose:   Nares normal, septum midline, mucosa normal, no drainage    or sinus tenderness   Throat:   Lips, mucosa, and tongue normal; missing teeth, gold caps on incisors    Neck:   Supple, symmetrical, trachea midline, no adenopathy;     thyroid:  no enlargement/tenderness/nodules; no carotid    bruit or JVD   Back:     Symmetric, no curvature, ROM normal, no CVA tenderness   Lungs:     CTAB, normal WOB   Chest Wall:    No tenderness or deformity    Heart:    Regular rate and rhythm, S1 and S2 normal, no murmur, rub   or gallop       Abdomen:     Soft, non-tender, bowel sounds active all four quadrants,     no masses, no organomegaly           Extremities:   Extremities normal, atraumatic, no cyanosis or edema   Pulses:   2+ and symmetric all extremities   Skin: "   Skin color, texture, turgor normal, no rashes or lesions   Lymph nodes:   Cervical, supraclavicular, and axillary nodes normal   Neurologic:   CNII-XII intact, normal strength, sensation and reflexes     throughout         Other Results:  EKG (my interpretation): .sinus rate and rhythm, Normal intervals, normal axis, no acute ST or TWA    Current Medications:     Infusions:       Scheduled:   albuterol-ipratropium  3 mL Nebulization Q6H WAKE    amLODIPine  10 mg Oral Daily    atenoloL  100 mg Oral Daily    atorvastatin  40 mg Oral QHS    clopidogreL  75 mg Oral Daily    doxazosin  8 mg Oral QHS    enoxparin  40 mg Subcutaneous Daily    erythromycin   Left Eye Q6H    insulin glargine U-100  7 Units Subcutaneous QHS    losartan  100 mg Oral Daily    predniSONE  40 mg Oral Daily    sertraline  50 mg Oral Daily        PRN:    Current Facility-Administered Medications:     dextrose 10%, 12.5 g, Intravenous, PRN    dextrose 10%, 12.5 g, Intravenous, PRN    dextrose 10%, 25 g, Intravenous, PRN    dextrose 10%, 25 g, Intravenous, PRN    glucagon (human recombinant), 1 mg, Intramuscular, PRN    glucagon (human recombinant), 1 mg, Intramuscular, PRN    glucose, 16 g, Oral, PRN    glucose, 16 g, Oral, PRN    glucose, 24 g, Oral, PRN    glucose, 24 g, Oral, PRN    insulin aspart U-100, 0-5 Units, Subcutaneous, QID (AC + HS) PRN    Pharmacy to dose Vancomycin consult, , , Once **AND** vancomycin - pharmacy to dose, , Intravenous, pharmacy to manage frequency    Antibiotics and Day Number of Therapy:  None      Assessment:      Christine Delgadillo is a 74 y.o. female with PMHx of HTN, falls and morbid obesity who presented [facility] on [facility] for SOB x 1 week, worsened in last 24hr.  Patient is admitted to [facility] for Acute Hypoxic Respiratory failure 2/2 HFpEF exacerbation over URI     Plan:     Acute Hypoxic Respiratory Failure  SOB  Wheezing   HFpEF exacerbation / Upper Respiratory Infection  Pt states she is  intermittently SOB at baseline to the degree of being limited to mobility bound within the home. Additionally with chronic, nonproductive cough intermittently at baseline. Uses a walker. Pt endorses worsened SOB for a week and progressively worsen x 1 day. Likely given remote hx of tobacco use, large body habitus pt with underlying restrictive lung disease and  possibly complicated by an acute upper viral infection. However this pt did not have resolution of symptoms with therapy in ED so further evaluation warranted broadening differential to a cardiac cause. POCUS completed at bedside with limited view given body habitus, but did not see obvious fluid or concerningly low EF.   - flu, COVID neg  -    - CXR WNL  - TTE notable for G2 Diastolic dysfunction, PA pressure 65 indicating likely PH  - diuresed with IV lasix  - weaned to RA, failed 6 minute walk test. Will set up home O2  - sending out to finish course of Pred 40     Acute Kidney Injury  CKD 3a  - sCr 1.6 on 12/11, baseline 1.2  - likely 2/2 over diureses  - giving back fluids, rechecking this afternoon     Electrolyte derangements  Hyperkalemia (resolved)  - CTM,  BMP    Left Periorbital contusion, POA  Preseptal orbital cellulitis  With periorbital edema, sclera is blood shot, with periorbital bruising noted and yellowish green purulent drainage from skin opening. No proptosis, ophthalmoplegia or vision impairment  - ordered vancomycin, ordered erythromycin ointment  - continue previous recommendation of following up with ophthalmology outpatient     HTN  - reported home medication atenolol 100mg daily, amlodipine 10mg daily, losartan 100 daily, doxazosin     HLD  - reported home medication atorvastatin 40mg daily     Obesity  - consider referral to pulm outpatient for sleep study  - consider referral to bariatrics for weight loss management     HCM  - recommend colonoscopy  - recommend immunizations, pt declined Flu/COVID/Pneumovax/RSV     Diet:  diabetic  Code: .full  DVT ppx: lovenox  Dispo: likely discharge today  Estimated LOS: 24-48 hourse    Giovanny San MD  [facility] HO-III    [facility] Hospitalist Pager numbers:   [facility] Hospitalist Medicine Team A (Dominic/Romana): [phone]  [facility] Hospitalist Medicine Team B (Catracho/Raymond):  [phone]

## 2024-12-11 NOTE — PLAN OF CARE
Problem: Adult Inpatient Plan of Care  Goal: Patient-Specific Goal (Individualized)  Outcome: Progressing     Problem: Bariatric Environmental Safety  Goal: Safety Maintained with Care  Outcome: Progressing     Problem: Diabetes Comorbidity  Goal: Blood Glucose Level Within Targeted Range  Outcome: Progressing     Problem: Skin Injury Risk Increased  Goal: Skin Health and Integrity  Outcome: Progressing     AAOx4. Medications administered per MAR. Glucose monitored. Safety precautions maintained. Call bell within reach.

## 2024-12-11 NOTE — CARE UPDATE
[facility]      HOME HEALTH ORDERS  FACE TO FACE ENCOUNTER    Patient Name: Christine Delgadillo  YOB: 1950    PCP: Pastora Jensen FNP   PCP Address: [address] / [facility]*  PCP Phone Number: [phone]  PCP Fax: [phone]    Encounter Date: 12/7/24    Admit to Home Health    Diagnoses:  Active Hospital Problems    Diagnosis  POA    COREY (acute kidney injury) [N17.9]  No    Pulmonary hypertension [I27.20]  Yes    Wheezing [R06.2]  Yes    Morbid obesity [E66.01]  Yes    Normocytic anemia [D64.9]  Yes    Falls [R29.6]  Not Applicable    Type 2 diabetes mellitus without complication, with long-term current use of insulin [E11.9, Z79.4]  Not Applicable    Hypertension [I10]  Yes    Preseptal cellulitis [L03.213]  Yes      Resolved Hospital Problems    Diagnosis Date Resolved POA    *Acute hypoxic respiratory failure [J96.01] 12/10/2024 Yes    Acute heart failure with preserved ejection fraction (HFpEF) [I50.31] 12/11/2024 Yes    Viral URI with cough [J06.9] 12/11/2024 Yes    Hyperkalemia [E87.5] 12/10/2024 Yes    Shortness of breath [R06.02] 12/10/2024 Yes       Follow Up Appointments:  No future appointments.    Allergies:  Review of patient's allergies indicates:   Allergen Reactions    Codeine        Medications: Review discharge medications with patient and family and provide education.      I have seen and examined this patient within the last 30 days. My clinical findings that support the need for the home health skilled services and home bound status are the following:no   Weakness/numbness causing balance and gait disturbance due to Heart Failure and Weakness/Debility making it taxing to leave home.     Diet:   cardiac diet    Labs:  None    Referrals/ Consults  Physical Therapy to evaluate and treat. Evaluate for home safety and equipment needs; Establish/upgrade home exercise program. Perform / instruct on therapeutic exercises, gait training,  transfer training, and Range of Motion.  Occupational Therapy to evaluate and treat. Evaluate home environment for safety and equipment needs. Perform/Instruct on transfers, ADL training, ROM, and therapeutic exercises.    Activities:   activity as tolerated    Nursing:   Agency to admit patient within 24 hours of hospital discharge unless specified on physician order or at patient request    SN to complete comprehensive assessment including routine vital signs. Instruct on disease process and s/s of complications to report to MD. Review/verify medication list sent home with the patient at time of discharge  and instruct patient/caregiver as needed. Frequency may be adjusted depending on start of care date.     Skilled nurse to perform up to 3 visits PRN for symptoms related to diagnosis    Notify MD if SBP > 160 or < 90; DBP > 90 or < 50; HR > 120 or < 50; Temp > 101; O2 < 88%;    Ok to schedule additional visits based on staff availability and patient request on consecutive days within the home health episode.    Miscellaneous   None    Home Health Aide:  Physical Therapy Three times weekly and Occupational Therapy Three times weekly    Wound Care Orders  no    I certify that this patient is confined to her home and needs physical therapy and occupational therapy    Giovanny San MD  [facility], HO - III  U Team A.

## 2024-12-11 NOTE — PLAN OF CARE
Pt has been approved by [facility] Phone: [phone].  Cm will continue to follow pt through transitions of care and assist with any discharge needs.    AALIYAH Mac  [phone]    No future appointments.     12/11/24 0859   Post-Acute Status   Post-Acute Authorization Home Health   Home Health Status Set-up Complete/Auth obtained   Discharge Plan   Discharge Plan A Home Health

## 2024-12-11 NOTE — PLAN OF CARE
Problem: Physical Therapy  Goal: Physical Therapy Goal  Description: Goals to be met by: 25     Patient will increase functional independence with mobility by performin. Supine to sit with Modified [place]  2. Sit to supine with Modified [place]  3. Sit to stand transfer with Modified [place] with use of rollator.   4. Bed to chair transfer with Modified [place] using rollator.  5. Gait  x 150 feet with Modified [place] using rollator.  6. Ascend/descend 5 stair with bilateral Handrails Modified [place].    Outcome: Progressing

## 2024-12-11 NOTE — PT/OT/SLP PROGRESS
Physical Therapy Treatment    Patient Name:  Christine Delgadillo   MRN:  [id number]    Recommendations:     Discharge Recommendations: Low Intensity Therapy  Discharge Equipment Recommendations: none  Barriers to discharge:  decreased mobility,strength and endurance.    Assessment:     Christine Delgadillo is a 74 y.o. female admitted with a medical diagnosis of Acute hypoxic respiratory failure.  She presents with the following impairments/functional limitations: weakness, impaired endurance, impaired functional mobility, gait instability, impaired balance, decreased lower extremity function, impaired coordination, impaired skin,pt with improving status and may benefit from low intensity therapy upon discharge,pt not sure if she wants therapy upon discharge home.    Rehab Prognosis: Good; patient would benefit from acute skilled PT services to address these deficits and reach maximum level of function.    Recent Surgery: * No surgery found *      Plan:     During this hospitalization, patient to be seen 3 x/week to address the identified rehab impairments via gait training, therapeutic activities, therapeutic exercises, neuromuscular re-education and progress toward the following goals:    Plan of Care Expires:  01/09/25    Subjective     Chief Complaint: n/a  Patient/Family Comments/goals: pt may go home today.  Pain/Comfort:  Pain Rating 1: 0/10      Objective:     Communicated with nsg prior to session.  Patient found supine with bed alarm, PureWick, peripheral IV, telemetry upon PT entry to room.     General Precautions: Standard, fall  Orthopedic Precautions: N/A  Braces: N/A  Respiratory Status: Room air     Functional Mobility:  Bed Mobility:     Supine to Sit: modified independence  Transfers:     Sit to Stand:  supervision with rollator  Bed to Chair: supervision with  rollator  using  ambulation  Gait: amb ~90' with rollator and S  Balance: fair standing balance      AM-PAC 6 CLICK MOBILITY  Turning over in bed  (including adjusting bedclothes, sheets and blankets)?: 4  Sitting down on and standing up from a chair with arms (e.g., wheelchair, bedside commode, etc.): 3  Moving from lying on back to sitting on the side of the bed?: 4  Moving to and from a bed to a chair (including a wheelchair)?: 3  Need to walk in hospital room?: 3  Climbing 3-5 steps with a railing?: 3  Basic Mobility Total Score: 20       Treatment & Education: le seated ex's x 10 reps inc: ap,laq and hip flex,requests met and needs in reach.      Patient left up in chair with all lines intact, call button in reach, chair alarm on, and nsg notified..    GOALS: see general POC  Multidisciplinary Problems       Physical Therapy Goals          Problem: Physical Therapy    Goal Priority Disciplines Outcome Interventions   Physical Therapy Goal     PT, PT/OT Progressing    Description: Goals to be met by: 25     Patient will increase functional independence with mobility by performin. Supine to sit with Modified [place]  2. Sit to supine with Modified [place]  3. Sit to stand transfer with Modified [place] with use of rollator.   4. Bed to chair transfer with Modified [place] using rollator.  5. Gait  x 150 feet with Modified [place] using rollator.  6. Ascend/descend 5 stair with bilateral Handrails Modified [place].                         Time Tracking:     PT Received On: 24  PT Start Time: 956     PT Stop Time: 1020  PT Total Time (min): 24 min     Billable Minutes: Gait Training 14 and Therapeutic Exercise 10    Treatment Type: Treatment  PT/PTA: PTA     Number of PTA visits since last PT visit: 2024

## 2024-12-11 NOTE — DISCHARGE SUMMARY
[facility] Medicine Discharge Summary    Primary Team: [facility] Hospitalist Team A  Attending Physician: Azeb Avendano MD  Resident: Mena  Intern: Kwadwo    Date of Admit: 12/7/2024  Date of Discharge: 12/11/2024    Discharge to: Home  Condition: Good    Discharge Diagnoses     Patient Active Problem List   Diagnosis    Normocytic anemia    Falls    Type 2 diabetes mellitus without complication, with long-term current use of insulin    Hypertension    Preseptal cellulitis    Wheezing    Morbid obesity    Pulmonary hypertension       Consultants and Procedures     Consultants:  None    Procedures:   None    Imaging:  CT head, cervical, maxillofacial     Brief History of Present Illness      Christine Delgadillo is a 74 y.o. female with Pmhx  HTN, shortness of breath, falls and morbid obesity who presented to [facility] on 12/7/2024 for SOB.      The patient was in their usual state of health until approximately 2 weeks ago where she started to have SOB intermittently and worsened this morning. Per EMS, pt's O2 saturation at 88% enroute, wheezing and placed on 2L NC. Given albuterol with mild improvement.      During formal evaluation, pt states she was in her normal state of health until 1 week ago when she noticed she was increasingly SOB beyond her baseline SOB. Since yesterday, felt progressively worse which led her to go tot the ED. Denies chest pain, palpitations, subjective fevers, chills, n/v/d. Denies sick contacts or recent travel.      Of note this pt was here on 12/1 for a traumatic fall, injury to the left side of her head and face on a bedside table. She denies any loss of consciousness. Stated that she did not trip over anything. Sustained a left periorbital contusion. Was discharged home and recommended to follow up with primary care and ophthalmology.     For the full HPI please refer to the History & Physical from this admission.    Hospital Course By Problem with Pertinent Findings     Acute Hypoxic  Respiratory Failure  SOB  Wheezing   HFpEF exacerbation / Upper Respiratory Infection  Pt states she is intermittently SOB at baseline to the degree of being limited to mobility bound within the home. Additionally with chronic, nonproductive cough intermittently at baseline. Uses a walker. Pt endorses worsened SOB for a week and progressively worsen x 1 day. Likely given remote hx of tobacco use, large body habitus pt with underlying restrictive lung disease and  possibly complicated by an acute upper viral infection. However this pt did not have resolution of symptoms with therapy in ED so further evaluation warranted broadening differential to a cardiac cause. POCUS completed at bedside with limited view given body habitus, but did not see obvious fluid or concerningly low EF.   - flu, COVID neg  -    - CXR WNL  - TTE notable for G2 Diastolic dysfunction, PA pressure 65 indicating likely PH  - diuresed with IV lasix  - weaned to RA, failed 6 minute walk test. Set up home O2  - sending out to finish course of Pred  40   - Added jardiance 10 for GDMT     Acute Kidney Injury  CKD 3a  - sCr 1.6 on 12/11, baseline 1.2  - likely 2/2 over diureses  - encouraged PO, recommend follow up with PCP for BMP in one week      Electrolyte derangements  Hyperkalemia (resolved)  - CTM,  BMP     Left Periorbital contusion, POA  Preseptal orbital cellulitis  With periorbital edema, sclera is blood shot, with periorbital bruising noted and yellowish green purulent drainage from skin opening. No proptosis, ophthalmoplegia or vision impairment  - Vancomycin, ordered erythromycin inpatient  - Switching to Doxycycline 100 BID with end date 12/14 ; continue erythromycin q6 til 12/14     HTN  - reported home medication atenolol 100mg daily, amlodipine 10mg daily, losartan 100 daily, doxazosin     T2DM  - A1c 5.4  - on Lantus 15 nightly  - added jaridance for GDMT with new HFpEF     HLD  - reported home medication atorvastatin 40mg  daily     Obesity  - consider referral to pulm outpatient for sleep study  - consider referral to bariatrics for weight loss management     HCM  - recommend colonoscopy  - recommend immunizations, pt declined Flu/COVID/Pneumovax/RSV      Discharge Medications        Medication List        START taking these medications      doxycycline 100 MG Cap  Commonly known as: VIBRAMYCIN  Take 1 capsule (100 mg total) by mouth every 12 (twelve) hours. for 3 days     empagliflozin 10 mg tablet  Commonly known as: JARDIANCE  Take 1 tablet (10 mg total) by mouth once daily.     erythromycin ophthalmic ointment  Commonly known as: ROMYCIN  Place into the left eye every 6 (six) hours. for 3 days     predniSONE 20 MG tablet  Commonly known as: DELTASONE  Take 2 tablets (40 mg total) by mouth once daily. for 2 days  Start taking on: December 12, 2024            CONTINUE taking these medications      amLODIPine 10 MG tablet  Commonly known as: NORVASC     atenoloL 100 MG tablet  Commonly known as: TENORMIN     atorvastatin 40 MG tablet  Commonly known as: LIPITOR     clopidogreL 75 mg tablet  Commonly known as: PLAVIX     doxazosin 8 MG Tab  Commonly known as: CARDURA     ferrous sulfate Tab tablet  Commonly known as: FEOSOL     LANTUS SOLOSTAR U-100 INSULIN SUBQ     losartan 100 MG tablet  Commonly known as: COZAAR     sertraline 50 MG tablet  Commonly known as: ZOLOFT     VITAMIN D ORAL            STOP taking these medications      albuterol 90 mcg/actuation inhaler  Commonly known as: PROVENTIL/VENTOLIN HFA     aspirin 81 MG EC tablet  Commonly known as: ECOTRIN     TURMERIC ORAL               Where to Get Your Medications        These medications were sent to [facility] [address], [place] [address]      Hours: Mon-Fri, 8a-5:30p Phone: [phone]   doxycycline 100 MG Cap  empagliflozin 10 mg tablet  erythromycin ophthalmic ointment  predniSONE 20 MG tablet         Discharge Information:    Diet:  Cardiac    Physical Activity:  As tolerated with O2             Instructions:  1. Take all medications as prescribed  2. Keep all follow-up appointments  3. Return to the hospital or call your primary care physicians if any worsening symptoms such as fever, chest pain, shortness of breath, return of symptoms, or any other concerns.    Follow-Up Appointments:  PCP  Pulmonary HTN  Sleep study     Giovanny San MD  [facility], [facility]

## 2024-12-11 NOTE — MEDICAL/APP STUDENT
[facility] Medicine Progress Note    Primary Team: [facility] Hospitalist Team A  Attending Physician: Azeb Avendano MD  Resident: Giovanny  Intern: Re    Date of Admit: 2024     Chief Complaint: Acute respiratory failure     Subjective/Interval Events:      NAEON. VSS this morning. O2 sat 94% this morning on room air. Patient denies any problems this morning. Denies fever, chills, chest pain, SOB, swelling.        Objective:   Last 24 Hour Vital Signs:  BP  Min: 129/44  Max: 168/96  Temp  Av.2 °F (36.8 °C)  Min: 97.6 °F (36.4 °C)  Max: 98.8 °F (37.1 °C)  Pulse  Av.6  Min: 56  Max: 66  Resp  Av.7  Min: 18  Max: 20  SpO2  Av.7 %  Min: 92 %  Max: 100 %    Intake/Output Summary (Last 24 hours) at 2024 0718  Last data filed at 2024 0339  Gross per 24 hour   Intake 360 ml   Output 2475 ml   Net -2115 ml       Physical Examination:  Physical Exam  Constitutional:       Appearance: She is obese.   HENT:      Head: Normocephalic.   Eyes:      General:         Left eye: Discharge (mild yellow discharge from corner of the L eye) present.     Pupils: Pupils are equal, round, and reactive to light.      Comments: L sclera bloodshot/bright red   Cardiovascular:      Rate and Rhythm: Normal rate.      Pulses: Normal pulses.      Heart sounds: Normal heart sounds.   Pulmonary:      Breath sounds: Wheezing (End expiratory wheeze bilaterally) present.   Abdominal:      General: Abdomen is flat.      Palpations: Abdomen is soft.      Tenderness: There is no abdominal tenderness.   Musculoskeletal:      Cervical back: Normal range of motion.      Right lower leg: No edema.      Left lower leg: No edema.   Skin:     General: Skin is warm.      Findings: Bruising (L eye) present.   Neurological:      General: No focal deficit present.      Mental Status: She is alert and oriented to person, place, and time.      Cranial Nerves: No cranial nerve deficit.   Psychiatric:         Mood and Affect: Mood  normal.          Laboratory:  Recent Labs   Lab 12/09/24  0722 12/09/24  0854 12/10/24  0433 12/11/24  0442   WBC 12.46  --  12.83* 14.88*   HGB 9.7*  --  9.1* 9.2*   HCT 33.8*  --  29.7* 29.1*     --  231 256   MCV 99*  --  92 89   RDW 14.8*  --  14.0 13.7   NA  --  140 140 136   K  --  4.3 4.2 4.0   CL  --  109 108 105   CO2  --  22* 21* 21*   BUN  --  27* 31* 41*   CREATININE  --  1.3 1.2 1.6*   GLU  --  148* 137* 136*   PROT  --  6.5 6.3 6.4   ALBUMIN  --  2.9* 2.7* 2.9*   BILITOT  --  1.1* 0.6 0.5   AST  --  29 24 21   ALKPHOS  --  61 63 60   ALT  --  22 21 20       Microbiology:  Influenza A and B negative  COVID negative    Imaging:  CXR: no acute changes. Lung is upper normal in size. Symmetric. No pleural fluid or pneumothorax    Current Medications:     Scheduled:   albuterol-ipratropium  3 mL Nebulization Q6H WAKE    amLODIPine  10 mg Oral Daily    atenoloL  100 mg Oral Daily    atorvastatin  40 mg Oral QHS    clopidogreL  75 mg Oral Daily    doxazosin  8 mg Oral QHS    enoxparin  40 mg Subcutaneous Daily    erythromycin   Left Eye Q6H    insulin glargine U-100  7 Units Subcutaneous QHS    losartan  100 mg Oral Daily    predniSONE  40 mg Oral Daily    sertraline  50 mg Oral Daily         Infusions:       PRN:    Current Facility-Administered Medications:     dextrose 10%, 12.5 g, Intravenous, PRN    dextrose 10%, 12.5 g, Intravenous, PRN    dextrose 10%, 25 g, Intravenous, PRN    dextrose 10%, 25 g, Intravenous, PRN    glucagon (human recombinant), 1 mg, Intramuscular, PRN    glucagon (human recombinant), 1 mg, Intramuscular, PRN    glucose, 16 g, Oral, PRN    glucose, 16 g, Oral, PRN    glucose, 24 g, Oral, PRN    glucose, 24 g, Oral, PRN    insulin aspart U-100, 0-5 Units, Subcutaneous, QID (AC + HS) PRN    Pharmacy to dose Vancomycin consult, , , Once **AND** vancomycin - pharmacy to dose, , Intravenous, pharmacy to manage frequency    Assessment:     Christine Delgadillo is a 74 y.o. female with a  PMHx of HTN, T2DM, HLD, history of cigarette smoking presenting with SOB. Patient does not have a diagnosed history of COPD but has a history of cigarette smoking with 5 pack year. Patient also reports history of exertional dyspnea and shortness of breath that has been going on for about 2 weeks that became worse today.      Plan:     Acute hypoxic respiratory failure    Pt states she is intermittently SOB at baseline to the degree of being limited to mobility bound within the home. Additionally with chronic, nonproductive cough intermittently at baseline. Uses a walker. Pt endorses worsened SOB for a week and progressively worsen x 1 day. Likely given remote hx of tobacco use, large body habitus pt with underlying restrictive lung disease and  possibly complicated by an acute upper viral infection. However this pt did not have resolution of symptoms with therapy in ED so further evaluation warranted broadening differential to a cardiac cause. POCUS completed at bedside with limited view given body habitus, but did not see obvious fluid or concerningly low EF.   - flu, COVID neg  -   - CXR no acute findings on admission  - TTE done 12/9   - normal systolic function Ejection fraction of 55-60%.   - Grade II diastolic dysfunction. Elevated LV filling pressure  Plan:   - supplemental O2 as tolerated   - outpatient follow up for lasix  - Add jardiance on discharge  - finish 5 day steroid course  - 2.2L urine output past 24 hours  - continue prednisone and duonebs treatment  - failed 6min walk test. O2 sat droppped to 87%    Recent Fall/Trauma  #periorbital contusion  #preseptal cellulitis/infection  - he was recently presented to the ED on 12/01 from a periorbital contusion from a fall from the bed during a sleep.  - L eye is edematous and purulent; Patient denies any vision changes or pain with eye movement  Plan:  - vancomycin dosed by pharmacy for possible preseptal cellulitis covering MRSA     Leukocytosis  -  possible infection of the L eye  Plan:  - continue vancomycin and erythromycin eye ointment  - WBC improved 12/9  - monitor daily CBC       Hypertension  - initial blood pressure 162/72  Plan:  - Continue home meds:   - Losartan 100mg PO daily  - amlodipine 10mg PO daily   - doxazosin 9mg PO nightly  - continue to monitor BP     Anemia, Normocytic  - on admission, Hgb 9.9, Hct 31.1, MVC 91, iron 22, TIBC 246, ferretin 608, folate 8.7, B12 596  - low iron, high Ferritin, normocytic  Plan:  - iron supplementation    HLD:  Plan:  - lipid panel - normal  - continue home Lipitor 40mg nightly     T2DM  - patient takes 15 units of insulin nightly  Plan:  - monitor POCT  - sliding scale insulin      CKD3A  - patient's eGFR 48 and Cr 1.2 on admission (around her baseline)  Plan:  Continue to monitor      Obesity:  - BMI 40.74  Plan:  Outpatient follow up on discharge    Diet: Diabetic diet   Code: Full  Dispo: Home when stable      Jaeyeon Kweon [facility] MS4       [facility] Medicine Hospitalist Pager numbers:   [facility] Hospitalist Medicine Team A (Dominic/Romana): [phone]  [facility] Hospitalist Medicine Team B (Catracho/Raymond):  [phone]

## 2024-12-11 NOTE — PLAN OF CARE
SW met with pt at bedside to complete final d.c assessment. Pt signed for home O2 and sw delivered to bedside. Pt stated that she no longer feels like she will need HH. Pt declared that her son will help transport her home later today. SW requested f/u appts for pt. Rounds completed on pt. All questions addressed. Bedside nurse to discuss d/c medications. Discussed importance to attend all f/u appts and take medications as prescribed. Verbalized understanding. Case Management to sign off.     AALIYAH Mac  [phone]    No future appointments.     12/11/24 1124   Final Note   Assessment Type Final Discharge Note   Anticipated Discharge Disposition Home   Post-Acute Status   Coverage phn   Discharge Delays None known at this time

## 2024-12-19 ENCOUNTER — TELEPHONE (OUTPATIENT)
Dept: SLEEP MEDICINE | Facility: OTHER | Age: 74
End: 2024-12-19
Payer: MEDICARE